# Patient Record
Sex: FEMALE | Race: WHITE | NOT HISPANIC OR LATINO | Employment: FULL TIME | URBAN - METROPOLITAN AREA
[De-identification: names, ages, dates, MRNs, and addresses within clinical notes are randomized per-mention and may not be internally consistent; named-entity substitution may affect disease eponyms.]

---

## 2017-03-31 ENCOUNTER — RADIANT APPOINTMENT (OUTPATIENT)
Dept: GENERAL RADIOLOGY | Facility: CLINIC | Age: 27
End: 2017-03-31
Attending: INTERNAL MEDICINE
Payer: COMMERCIAL

## 2017-03-31 ENCOUNTER — OFFICE VISIT (OUTPATIENT)
Dept: RHEUMATOLOGY | Facility: CLINIC | Age: 27
End: 2017-03-31
Payer: COMMERCIAL

## 2017-03-31 VITALS
HEART RATE: 92 BPM | DIASTOLIC BLOOD PRESSURE: 79 MMHG | WEIGHT: 171.4 LBS | SYSTOLIC BLOOD PRESSURE: 129 MMHG | TEMPERATURE: 98.2 F | BODY MASS INDEX: 29.65 KG/M2

## 2017-03-31 DIAGNOSIS — R82.81 PYURIA: ICD-10-CM

## 2017-03-31 DIAGNOSIS — E55.9 VITAMIN D INSUFFICIENCY: ICD-10-CM

## 2017-03-31 DIAGNOSIS — M08.00 JUVENILE RHEUMATOID ARTHRITIS (H): Primary | ICD-10-CM

## 2017-03-31 DIAGNOSIS — Z79.60 LONG-TERM USE OF IMMUNOSUPPRESSANT MEDICATION: ICD-10-CM

## 2017-03-31 DIAGNOSIS — M08.00 JUVENILE RHEUMATOID ARTHRITIS (H): ICD-10-CM

## 2017-03-31 DIAGNOSIS — K20.0 EOSINOPHILIC ESOPHAGITIS: ICD-10-CM

## 2017-03-31 PROCEDURE — 73110 X-RAY EXAM OF WRIST: CPT | Mod: RT | Performed by: RADIOLOGY

## 2017-03-31 PROCEDURE — 73130 X-RAY EXAM OF HAND: CPT | Mod: LT | Performed by: RADIOLOGY

## 2017-03-31 PROCEDURE — 99214 OFFICE O/P EST MOD 30 MIN: CPT | Performed by: INTERNAL MEDICINE

## 2017-03-31 RX ORDER — HYDROXYCHLOROQUINE SULFATE 200 MG/1
200 TABLET, FILM COATED ORAL 2 TIMES DAILY
Qty: 180 TABLET | Refills: 3 | Status: SHIPPED | OUTPATIENT
Start: 2017-03-31 | End: 2017-10-20

## 2017-03-31 RX ORDER — PREDNISONE 5 MG/1
5 TABLET ORAL DAILY PRN
Qty: 30 TABLET | Refills: 0 | Status: CANCELLED | OUTPATIENT
Start: 2017-03-31

## 2017-03-31 RX ORDER — ERGOCALCIFEROL 1.25 MG/1
50000 CAPSULE, LIQUID FILLED ORAL
Qty: 3 CAPSULE | Refills: 3 | Status: SHIPPED | OUTPATIENT
Start: 2017-03-31 | End: 2017-10-20

## 2017-03-31 ASSESSMENT — ROUTINE ASSESSMENT OF PATIENT INDEX DATA (RAPID3)
TOTAL RAPID3 SCORE: 2.5
RAPID3 INTERPRETATION: REMISSION

## 2017-03-31 ASSESSMENT — PAIN SCALES - GENERAL: PAINLEVEL: NO PAIN (0)

## 2017-03-31 NOTE — NURSING NOTE
"Alka Casey's goals for this visit include:   Chief Complaint   Patient presents with     RECHECK     6 month follow up        She requests these members of her care team be copied on today's visit information:yes     PCP: Rojelio Ivey    Referring Provider:  No referring provider defined for this encounter.    Chief Complaint   Patient presents with     RECHECK     6 month follow up        Initial /79  Pulse 92  Temp 98.2  F (36.8  C)  Wt 77.7 kg (171 lb 6.4 oz)  LMP 03/17/2017  BMI 29.65 kg/m2 Estimated body mass index is 29.65 kg/(m^2) as calculated from the following:    Height as of 12/1/16: 1.619 m (5' 3.75\").    Weight as of this encounter: 77.7 kg (171 lb 6.4 oz).  Medication Reconciliation: complete    Do you need any medication refills at today's visit? yes    "

## 2017-03-31 NOTE — PROGRESS NOTES
Ms. 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 -DELL, -CCP, -RF, -Ro. No erosions.    She complains of increasing lower back pain. The pain can occur in waves and sometimes radiate a bit to the left leg.  Stretching and strengthening exercises have been helpful. She has a history chronic low back pain. This can occasionally interfere with sleeping.    No other joint swelling, redness or warmth. No reported flare ups, or dysfunction and no new deformity. She does note a lot of cracking and popping around the joints. No AM stiffness. Poor energy. Depression control is fine.    Hydroxychloroquine 400 mg daily with good tolerance. Eye check normal in July.  No GERD complaints.    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 , no children. No tobacco, social EtOH, no illicit drugs. No exposures to infectious hepatitis or HIV. Right handed.     PMSF history personally obtained and updated by me today.    ROS:  +headaches  +cognitive/hearing issues  +irritable  +palpation  +finger tingling intermittently  +allergies to pets and seasonal allergies  Remainder of the 14 point ROS obtained and found negative.    Physical Exam:  Constitutional: WD-WN-WG cooperative  Eyes: nl EOM, PERRLA, vision, conjunctiva, sclera  ENT: nl external ears, nose, hearing, lips, teeth, gums, throat; +modest parotid swelling  Neck: no mass or thyroid enlargement  Resp: lungs clear to auscultation, nl to palpation, nl effort  CV: RRR, no murmurs, rubs or gallops, no edema  GI: no ABD mass or tenderness, no HSM  :  not tested  Lymph: no cervical or epitrochlear nodes  MS:  +Trace left wrist swelling; All other TMJ, neck, shoulder, elbow, wrist, hand, spine, hip, knee, ankle, and foot joints were examined and otherwise found normal. Normal  strength. Full ROM. Normal tuck and prayer.  Skin: no nail pitting, alopecia, rash  Neuro: nl cranial nerves, strength, sensation, DTRs.   Psych: nl judgement, orientation, memory, affect.    Laboratory:    My impression of the bilateral hand Xrays is subtle contour changes at both styloids and early DJD at both thumbs.     Impression:  Normal films of the hands.  I have personally reviewed the examination and initial interpretation  and I agree with the findings.     MAYCOL PINEDO MD  Impression:    Pauciarticular Chronic Juvenile Rheumatoid Arthritis-with excellent control of synovitis and no evidence of joint disease progression. Good tolerance of the hydroxycloroquine and we will continue with this regimen.    Eosinophilic esophagitis-stable and free of any major symptoms. Avoid ibuprofen.    Long term monitoring of immunosuppression-with eye check stable. Get laboratory testing today.    Plan RTC in 6 months.

## 2017-03-31 NOTE — LETTER
3/31/2017       RE: Alka Casey  [address]  [place] [address]     Dear Colleague,    Thank you for referring your patient, Alka Casey, to the [facility]. Please see a copy of my visit note below.    Ms. 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 -DELL, -CCP, -RF, -Ro. No erosions.    She complains of increasing lower back pain. The pain can occur in waves and sometimes radiate a bit to the left leg.  Stretching and strengthening exercises have been helpful. She has a history chronic low back pain. This can occasionally interfere with sleeping.    No other joint swelling, redness or warmth. No reported flare ups, or dysfunction and no new deformity. She does note a lot of cracking and popping around the joints. No AM stiffness. Poor energy. Depression control is fine.    Hydroxychloroquine 400 mg daily with good tolerance. Eye check normal in July.  No GERD complaints.    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 , no children. No tobacco, social EtOH, no illicit drugs. No exposures to infectious hepatitis or HIV. Right handed.     PMSF history personally obtained and updated by me today.    ROS:  +headaches  +cognitive/hearing issues  +irritable  +palpation  +finger tingling intermittently  +allergies to pets and seasonal allergies  Remainder of the 14 point ROS obtained and found negative.    Physical Exam:  Constitutional: WD-WN-WG cooperative  Eyes: nl EOM, PERRLA, vision, conjunctiva,  sclera  ENT: nl external ears, nose, hearing, lips, teeth, gums, throat; +modest parotid swelling  Neck: no mass or thyroid enlargement  Resp: lungs clear to auscultation, nl to palpation, nl effort  CV: RRR, no murmurs, rubs or gallops, no edema  GI: no ABD mass or tenderness, no HSM  : not tested  Lymph: no cervical or epitrochlear nodes  MS:  +Trace left wrist swelling; All other TMJ, neck, shoulder, elbow, wrist, hand, spine, hip, knee, ankle, and foot joints were examined and otherwise found normal. Normal  strength. Full ROM. Normal tuck and prayer.  Skin: no nail pitting, alopecia, rash  Neuro: nl cranial nerves, strength, sensation, DTRs.   Psych: nl judgement, orientation, memory, affect.    Laboratory:    My impression of the bilateral hand Xrays is subtle contour changes at both styloids and early DJD at both thumbs.     Impression:  Normal films of the hands.  I have personally reviewed the examination and initial interpretation  and I agree with the findings.     MAYCOL PINEDO MD  Impression:    Pauciarticular Chronic Juvenile Rheumatoid Arthritis-with excellent control of synovitis and no evidence of joint disease progression. Good tolerance of the hydroxycloroquine and we will continue with this regimen.    Eosinophilic esophagitis-stable and free of any major symptoms. Avoid ibuprofen.    Long term monitoring of immunosuppression-with eye check stable. Get laboratory testing today.    Plan RTC in 6 months.          Again, thank you for allowing me to participate in the care of your patient.      Sincerely,    Chris Duff MD

## 2017-08-21 ENCOUNTER — MYC MEDICAL ADVICE (OUTPATIENT)
Dept: FAMILY MEDICINE | Facility: CLINIC | Age: 27
End: 2017-08-21

## 2017-08-21 NOTE — TELEPHONE ENCOUNTER
Disregard this mychart message - per patient, this was meant for rheumatology and was resent to correct recipient.  .

## 2017-09-14 ENCOUNTER — TRANSFERRED RECORDS (OUTPATIENT)
Dept: HEALTH INFORMATION MANAGEMENT | Facility: CLINIC | Age: 27
End: 2017-09-14

## 2017-09-22 DIAGNOSIS — Z30.41 SURVEILLANCE OF PREVIOUSLY PRESCRIBED CONTRACEPTIVE PILL: ICD-10-CM

## 2017-09-22 NOTE — TELEPHONE ENCOUNTER
norgestrel-ethinyl estradiol (OVRAL) 0.5-50 MG-MCG per tablet      Last Written Prescription Date: 12/01/16  Last Fill Quantity: 84,  # refills: 3   Last Office Visit with FMZAY, DENY or [facility] prescribing provider: 12/01/16                                         Next 5 appointments (look out 90 days)     Oct 20, 2017  8:00 AM CDT   [facility] Visit with Chris Duff MD   [facility]    [address] [place] [id number]   [phone]                      Sunita Quiroz  [employer] [facility]

## 2017-10-20 ENCOUNTER — OFFICE VISIT (OUTPATIENT)
Dept: RHEUMATOLOGY | Facility: CLINIC | Age: 27
End: 2017-10-20
Payer: COMMERCIAL

## 2017-10-20 VITALS
DIASTOLIC BLOOD PRESSURE: 74 MMHG | OXYGEN SATURATION: 97 % | BODY MASS INDEX: 30.29 KG/M2 | HEART RATE: 87 BPM | WEIGHT: 175.1 LBS | SYSTOLIC BLOOD PRESSURE: 110 MMHG | TEMPERATURE: 98.4 F

## 2017-10-20 DIAGNOSIS — E55.9 VITAMIN D INSUFFICIENCY: ICD-10-CM

## 2017-10-20 DIAGNOSIS — K20.0 EOSINOPHILIC ESOPHAGITIS: ICD-10-CM

## 2017-10-20 DIAGNOSIS — Z79.60 LONG-TERM USE OF IMMUNOSUPPRESSANT MEDICATION: ICD-10-CM

## 2017-10-20 DIAGNOSIS — M08.00 JUVENILE RHEUMATOID ARTHRITIS (H): ICD-10-CM

## 2017-10-20 DIAGNOSIS — R82.81 PYURIA: ICD-10-CM

## 2017-10-20 LAB
ALBUMIN SERPL-MCNC: 3.4 G/DL (ref 3.4–5)
ALT SERPL W P-5'-P-CCNC: 35 U/L (ref 0–50)
AST SERPL W P-5'-P-CCNC: 30 U/L (ref 0–45)
BASOPHILS # BLD AUTO: 0 10E9/L (ref 0–0.2)
BASOPHILS NFR BLD AUTO: 0.6 %
CREAT SERPL-MCNC: 0.7 MG/DL (ref 0.52–1.04)
DIFFERENTIAL METHOD BLD: NORMAL
EOSINOPHIL # BLD AUTO: 0.4 10E9/L (ref 0–0.7)
EOSINOPHIL NFR BLD AUTO: 8.7 %
ERYTHROCYTE [DISTWIDTH] IN BLOOD BY AUTOMATED COUNT: 13 % (ref 10–15)
GFR SERPL CREATININE-BSD FRML MDRD: >90 ML/MIN/1.7M2
HCT VFR BLD AUTO: 41.3 % (ref 35–47)
HGB BLD-MCNC: 14.4 G/DL (ref 11.7–15.7)
LYMPHOCYTES # BLD AUTO: 1.6 10E9/L (ref 0.8–5.3)
LYMPHOCYTES NFR BLD AUTO: 33.9 %
MCH RBC QN AUTO: 30 PG (ref 26.5–33)
MCHC RBC AUTO-ENTMCNC: 34.9 G/DL (ref 31.5–36.5)
MCV RBC AUTO: 86 FL (ref 78–100)
MONOCYTES # BLD AUTO: 0.4 10E9/L (ref 0–1.3)
MONOCYTES NFR BLD AUTO: 7.4 %
NEUTROPHILS # BLD AUTO: 2.4 10E9/L (ref 1.6–8.3)
NEUTROPHILS NFR BLD AUTO: 49.4 %
PLATELET # BLD AUTO: 236 10E9/L (ref 150–450)
RBC # BLD AUTO: 4.8 10E12/L (ref 3.8–5.2)
WBC # BLD AUTO: 4.8 10E9/L (ref 4–11)

## 2017-10-20 PROCEDURE — 82565 ASSAY OF CREATININE: CPT | Performed by: INTERNAL MEDICINE

## 2017-10-20 PROCEDURE — 82040 ASSAY OF SERUM ALBUMIN: CPT | Performed by: INTERNAL MEDICINE

## 2017-10-20 PROCEDURE — 85025 COMPLETE CBC W/AUTO DIFF WBC: CPT | Performed by: INTERNAL MEDICINE

## 2017-10-20 PROCEDURE — 84460 ALANINE AMINO (ALT) (SGPT): CPT | Performed by: INTERNAL MEDICINE

## 2017-10-20 PROCEDURE — 84450 TRANSFERASE (AST) (SGOT): CPT | Performed by: INTERNAL MEDICINE

## 2017-10-20 PROCEDURE — 36415 COLL VENOUS BLD VENIPUNCTURE: CPT | Performed by: INTERNAL MEDICINE

## 2017-10-20 PROCEDURE — 99214 OFFICE O/P EST MOD 30 MIN: CPT | Performed by: INTERNAL MEDICINE

## 2017-10-20 PROCEDURE — 82306 VITAMIN D 25 HYDROXY: CPT | Performed by: INTERNAL MEDICINE

## 2017-10-20 RX ORDER — ERGOCALCIFEROL 1.25 MG/1
50000 CAPSULE, LIQUID FILLED ORAL
Qty: 3 CAPSULE | Refills: 3 | Status: SHIPPED | OUTPATIENT
Start: 2017-10-20 | End: 2018-05-24

## 2017-10-20 RX ORDER — HYDROXYCHLOROQUINE SULFATE 200 MG/1
200 TABLET, FILM COATED ORAL 2 TIMES DAILY
Qty: 180 TABLET | Refills: 3 | Status: SHIPPED | OUTPATIENT
Start: 2017-10-20 | End: 2018-06-08

## 2017-10-20 RX ORDER — PREDNISONE 5 MG/1
5 TABLET ORAL DAILY PRN
Qty: 30 TABLET | Refills: 0 | Status: CANCELLED | OUTPATIENT
Start: 2017-10-20

## 2017-10-20 ASSESSMENT — ROUTINE ASSESSMENT OF PATIENT INDEX DATA (RAPID3)
TOTAL RAPID3 SCORE: 4.8
RAPID3 INTERPRETATION: LOW 3.1-6

## 2017-10-20 NOTE — PROGRESS NOTES
Ms. 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 -DELL, -CCP, -RF, -Ro. No erosions.    She reports a left ankle inversion strain that resulted in swelling and pain about a month ago, this is only slowly improving, but she can walk on it and no instability. Her other joints are stable. Weather changes cause more joint aching. No other redness, warmth or swelling. She does report a synovial cyst at the base of the left 2nd MCP. She has sore hips with abduction. Energy is okay. No AM stiffness. Sleep is good. A bit more anxiety.    Hydroxychloroquine 400 mg daily with good tolerance. Eye check normal in September.  More GERD problems the past 3 days. Vitamin D for insufficiency.    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 , no children. No tobacco, social EtOH, no illicit drugs. No exposures to infectious hepatitis or HIV. Right handed.     PMSF history personally obtained and updated by me today in the clinic.    ROS:  +she has a bit more stress and anxiety  +palpitation with anxiety  +intermittent numbness in fingers  +minor GERD  +intermittent diarrhea  +allergies to pets and seasonal allergies  Remainder of the 14 point ROS obtained and found negative.    Physical Exam:  Constitutional: WD-WN-WG cooperative  Eyes: nl EOM, PERRLA, vision, conjunctiva, sclera  ENT: nl external ears, nose, hearing, lips, teeth, gums, throat; +modest parotid swelling  Neck: no mass or thyroid enlargement  Resp: lungs clear to auscultation, nl to palpation, nl effort  CV: RRR,  no murmurs, rubs or gallops, no edema  GI: no ABD mass or tenderness, no HSM  : not tested  Lymph: no cervical or epitrochlear nodes  MS:  +Trace bilateral wrist swelling L>R; trace left ankle swelling. All other TMJ, neck, shoulder, elbow, wrist, hand, spine, hip, knee, ankle, and foot joints were examined and otherwise found normal. Normal  strength. Full ROM. Normal tuck and prayer.  Skin: no nail pitting, alopecia, rash  Neuro: nl cranial nerves, strength, sensation, DTRs.   Psych: nl judgement, orientation, memory, affect.    Laboratory:    Component      Latest Ref Rng & Units 10/20/2017   WBC      4.0 - 11.0 10e9/L 4.8   RBC Count      3.8 - 5.2 10e12/L 4.80   Hemoglobin      11.7 - 15.7 g/dL 14.4   Hematocrit      35.0 - 47.0 % 41.3   MCV      78 - 100 fl 86   MCH      26.5 - 33.0 pg 30.0   MCHC      31.5 - 36.5 g/dL 34.9   RDW      10.0 - 15.0 % 13.0   Platelet Count      150 - 450 10e9/L 236   Diff Method       Automated Method   % Neutrophils      % 49.4   % Lymphocytes      % 33.9   % Monocytes      % 7.4   % Eosinophils      % 8.7   % Basophils      % 0.6   Absolute Neutrophil      1.6 - 8.3 10e9/L 2.4   Absolute Lymphocytes      0.8 - 5.3 10e9/L 1.6   Absolute Monocytes      0.0 - 1.3 10e9/L 0.4   Absolute Eosinophils      0.0 - 0.7 10e9/L 0.4   Absolute Basophils      0.0 - 0.2 10e9/L 0.0   Creatinine      0.52 - 1.04 mg/dL 0.70   GFR Estimate      >60 mL/min/1.7m2 >90   GFR Estimate If Black      >60 mL/min/1.7m2 >90   AST      0 - 45 U/L 30   ALT      0 - 50 U/L 35   Albumin      3.4 - 5.0 g/dL 3.4        Impression:    Pauciarticular Chronic Juvenile Rheumatoid Arthritis-with stable wrist synovitis. Nevertheless, good control of symptoms and no evidence of progression. Good tolerance of the hydroxychloroquine and normal eye check. We will continue the regimen. Considering pregnancy and we will have a conversation about the hydroxychloroquine once an attempt is planned.    Left ankle  injury-improving and stable left ankle function. We will simply monitor.    Eosinophilic esophagitis-stable and free of any major symptoms.    Long term monitoring of immunosuppression-with eye check stable.     Plan RTC in 6 months.

## 2017-10-20 NOTE — NURSING NOTE
"Alka Casey's goals for this visit include: Left ankle swollen and fatigue    Juvenile rheumatoid arthritis (H)   Long-term use of immunosuppressant medication   Vitamin D insufficiency   Eosinophilic esophagitis     She requests these members of her care team be copied on today's visit information: yes    PCP: Rojelio Ivey    Referring Provider:  No referring provider defined for this encounter.    Chief Complaint   Patient presents with     Arthritis       Initial /74 (BP Location: Left arm, Patient Position: Chair, Cuff Size: Adult Regular)  Pulse 87  Temp 98.4  F (36.9  C) (Oral)  Wt 79.4 kg (175 lb 1.6 oz)  SpO2 97%  BMI 30.29 kg/m2 Estimated body mass index is 30.29 kg/(m^2) as calculated from the following:    Height as of 12/1/16: 1.619 m (5' 3.75\").    Weight as of this encounter: 79.4 kg (175 lb 1.6 oz).  Medication Reconciliation: complete    Do you need any medication refills at today's visit? Yes     "

## 2017-10-20 NOTE — MR AVS SNAPSHOT
After Visit Summary   10/20/2017    Alka Casey    MRN: [id number]           Patient Information     Date Of Birth          1990        Visit Information        Provider Department      10/20/2017 8:00 AM Chris Duff MD [facility]        Today's Diagnoses     Juvenile rheumatoid arthritis (H)        Long-term use of immunosuppressant medication        Vitamin D insufficiency        Eosinophilic esophagitis        Pyuria        Juvenile rheumatoid arthritis           Follow-ups after your visit        Follow-up notes from your care team     Return in about 6 months (around 4/20/2018).      Your next 10 appointments already scheduled     Mar 23, 2018  8:00 AM CDT   Return Visit with Chris Duff MD   [facility] ([facility]    [address] [place] [id number] [phone]              Future tests that were ordered for you today     Open Standing Orders        Priority Remaining Interval Expires Ordered    CBC with platelets Routine 2/2 every 6 months 10/20/2018 10/20/2017    Creatinine Routine 2/2 every 6 months 10/20/2018 10/20/2017    AST Routine 2/2 every 6 months 10/20/2018 10/20/2017    ALT Routine 2/2 every 6 months 10/20/2018 10/20/2017    Albumin level Routine 2/2 every 6 months 10/20/2018 10/20/2017            Who to contact     If you have questions or need follow up information about today's clinic visit or your schedule please contact [facility] directly at [phone].  Normal or non-critical lab and imaging results will be communicated to you by MyChart, letter or phone within 4 business days after the clinic has received the results. If you do not hear from us within 7 days, please contact the clinic through MyChart or phone. If you have a critical or abnormal lab result, we will notify you by phone as soon as possible.  Submit refill requests through [employer] or call your pharmacy  and they will forward the refill request to us. Please allow 3 business days for your refill to be completed.          Additional Information About Your Visit        [employer] Information     [employer] gives you secure access to your electronic health record. If you see a primary care provider, you can also send messages to your care team and make appointments. If you have questions, please call your primary care clinic.  If you do not have a primary care provider, please call [phone] and they will assist you.      [employer] is an electronic gateway that provides easy, online access to your medical records. With [employer], you can request a clinic appointment, read your test results, renew a prescription or communicate with your care team.     To access your existing account, please contact your [facility] or call [phone] for assistance.        Care EveryWhere ID     This is your Care EveryWhere ID. This could be used by other organizations to access your [place] medical records  [id number]        Your Vitals Were     Pulse Temperature Pulse Oximetry BMI (Body Mass Index)          87 98.4  F (36.9  C) (Oral) 97% 30.29 kg/m2         Blood Pressure from Last 3 Encounters:   10/20/17 110/74   03/31/17 129/79   12/01/16 110/68    Weight from Last 3 Encounters:   10/20/17 79.4 kg (175 lb 1.6 oz)   03/31/17 77.7 kg (171 lb 6.4 oz)   12/01/16 76.3 kg (168 lb 3.2 oz)                 Where to get your medicines      These medications were sent to [id number] - [place], [place] - [address]  [address] [address]     Phone:  [phone]     hydroxychloroquine 200 MG tablet    vitamin D 35168 UNIT capsule          Primary Care Provider Office Phone # Fax #    Rojelio Ivey [phone]       [address]  [facility] [address]        Equal Access to Services     SAMMIE LANTIGUA AH: lou Kelly,  arvind haley toribiovale valverde ah. [facility] [phone].    ATENCIÓN: Si renetta ko, tiene a peñaloza disposición servicios gratuitos de asistencia lingüística. Jag al [phone].    We comply with applicable federal civil rights laws and [place] laws. We do not discriminate on the basis of race, color, national origin, age, disability, sex, sexual orientation, or gender identity.            Thank you!     Thank you for choosing [facility]  for your care. Our goal is always to provide you with excellent care. Hearing back from our patients is one way we can continue to improve our services. Please take a few minutes to complete the written survey that you may receive in the mail after your visit with us. Thank you!             Your Updated Medication List - Protect others around you: Learn how to safely use, store and throw away your medicines at www.disposemymeds.org.          This list is accurate as of: 10/20/17  8:44 AM.  Always use your most recent med list.                   Brand Name Dispense Instructions for use Diagnosis    adapalene 0.1 % gel    DIFFERIN    45 g    Apply topically At Bedtime    Acne vulgaris       hydroxychloroquine 200 MG tablet    PLAQUENIL    180 tablet    Take 1 tablet (200 mg) by mouth 2 times daily    Juvenile rheumatoid arthritis (H), Long-term use of immunosuppressant medication, Vitamin D insufficiency, Eosinophilic esophagitis, Pyuria, Juvenile rheumatoid arthritis (H)       levalbuterol 45 MCG/ACT Inhaler    XOPENEX HFA    1 Inhaler    Inhale 2 puffs into the lungs every 4 hours as needed for asthma symptoms.    Mild persistent asthma without complication       levocetirizine 5 MG tablet    XYZAL    90 tablet    Take 1 tablet (5 mg) by mouth daily for allergy prevention.    Perennial allergic rhinitis with seasonal variation       montelukast 10 MG tablet    SINGULAIR    90 tablet    Take 1 tablet (10 mg) by mouth daily  for asthma prevention.    Mild persistent asthma without complication       norgestrel-ethinyl estradiol 0.5-50 MG-MCG per tablet    OVRAL    84 tablet    Take 1 tablet by mouth daily    Surveillance of previously prescribed contraceptive pill       predniSONE 5 MG tablet    DELTASONE    30 tablet    Take 1 tablet (5 mg) by mouth daily as needed For joint pain and swelling flare. Use for 1 week.    Juvenile rheumatoid arthritis (H), Long-term use of immunosuppressant medication, Vitamin D insufficiency, Pyuria, Eosinophilic esophagitis, Juvenile rheumatoid arthritis (H)       sertraline 100 MG tablet    ZOLOFT    135 tablet    Take 1.5 tablets (150 mg) by mouth daily for depression.    Major depressive disorder, recurrent episode, mild (H)       vitamin D 58597 UNIT capsule    ERGOCALCIFEROL    3 capsule    Take 1 capsule (50,000 Units) by mouth every 30 days    Juvenile rheumatoid arthritis (H), Long-term use of immunosuppressant medication, Vitamin D insufficiency, Eosinophilic esophagitis, Pyuria, Juvenile rheumatoid arthritis (H)

## 2017-10-24 LAB
DEPRECATED CALCIDIOL+CALCIFEROL SERPL-MC: <25 UG/L (ref 20–75)
VITAMIN D2 SERPL-MCNC: <5 UG/L
VITAMIN D3 SERPL-MCNC: 20 UG/L

## 2017-11-02 ENCOUNTER — OFFICE VISIT (OUTPATIENT)
Dept: FAMILY MEDICINE | Facility: CLINIC | Age: 27
End: 2017-11-02
Payer: COMMERCIAL

## 2017-11-02 VITALS
HEIGHT: 64 IN | DIASTOLIC BLOOD PRESSURE: 76 MMHG | BODY MASS INDEX: 30.22 KG/M2 | OXYGEN SATURATION: 99 % | WEIGHT: 177 LBS | SYSTOLIC BLOOD PRESSURE: 124 MMHG | HEART RATE: 77 BPM | TEMPERATURE: 98.5 F

## 2017-11-02 DIAGNOSIS — J45.30 MILD PERSISTENT ASTHMA WITHOUT COMPLICATION: ICD-10-CM

## 2017-11-02 DIAGNOSIS — Z31.69 PRE-CONCEPTION COUNSELING: ICD-10-CM

## 2017-11-02 DIAGNOSIS — Z30.41 SURVEILLANCE OF PREVIOUSLY PRESCRIBED CONTRACEPTIVE PILL: ICD-10-CM

## 2017-11-02 DIAGNOSIS — J30.2 PERENNIAL ALLERGIC RHINITIS WITH SEASONAL VARIATION: ICD-10-CM

## 2017-11-02 DIAGNOSIS — F33.0 MAJOR DEPRESSIVE DISORDER, RECURRENT EPISODE, MILD (H): ICD-10-CM

## 2017-11-02 DIAGNOSIS — J30.89 PERENNIAL ALLERGIC RHINITIS WITH SEASONAL VARIATION: ICD-10-CM

## 2017-11-02 DIAGNOSIS — Z00.00 ENCOUNTER FOR ROUTINE ADULT HEALTH EXAMINATION WITHOUT ABNORMAL FINDINGS: Primary | ICD-10-CM

## 2017-11-02 DIAGNOSIS — Z79.899 LONG-TERM USE OF HIGH-RISK MEDICATION: ICD-10-CM

## 2017-11-02 PROCEDURE — 99395 PREV VISIT EST AGE 18-39: CPT | Performed by: FAMILY MEDICINE

## 2017-11-02 RX ORDER — MONTELUKAST SODIUM 10 MG/1
1 TABLET ORAL DAILY
Qty: 90 TABLET | Refills: 3 | Status: SHIPPED | OUTPATIENT
Start: 2017-11-02

## 2017-11-02 RX ORDER — SERTRALINE HYDROCHLORIDE 100 MG/1
100 TABLET, FILM COATED ORAL DAILY
Qty: 90 TABLET | Refills: 1 | Status: SHIPPED | OUTPATIENT
Start: 2017-11-02 | End: 2018-05-24

## 2017-11-02 RX ORDER — LEVALBUTEROL TARTRATE 45 UG/1
2 AEROSOL, METERED ORAL EVERY 4 HOURS PRN
Qty: 1 INHALER | Refills: 3 | Status: SHIPPED | OUTPATIENT
Start: 2017-11-02

## 2017-11-02 RX ORDER — LEVOCETIRIZINE DIHYDROCHLORIDE 5 MG/1
5 TABLET, FILM COATED ORAL DAILY
Qty: 90 TABLET | Refills: 3 | Status: SHIPPED | OUTPATIENT
Start: 2017-11-02 | End: 2018-12-21

## 2017-11-02 ASSESSMENT — ENCOUNTER SYMPTOMS
JOINT SWELLING: 0
WEAKNESS: 0
CHILLS: 0
HEARTBURN: 0
DIARRHEA: 0
FEVER: 0
ARTHRALGIAS: 0
ABDOMINAL PAIN: 0
PARESTHESIAS: 0
HEMATURIA: 0
SORE THROAT: 0
NAUSEA: 0
SHORTNESS OF BREATH: 0
HEMATOCHEZIA: 0
NERVOUS/ANXIOUS: 0
COUGH: 0
MYALGIAS: 0
DYSURIA: 0
FREQUENCY: 0
HEADACHES: 0
CONSTIPATION: 0
EYE PAIN: 0
DIZZINESS: 0
PALPITATIONS: 0

## 2017-11-02 NOTE — PROGRESS NOTES
SUBJECTIVE:   CC: Alka Casey is an 27 year old woman who presents for preventive health visit.     HPI Comments: Would also like a referral to a gynecologist because she will soon be trying to get pregnant.    Physical   Annual:     Getting at least 3 servings of Calcium per day::  NO    Bi-annual eye exam::  Yes    Dental care twice a year::  Yes    Sleep apnea or symptoms of sleep apnea::  Daytime drowsiness    Diet::  Regular (no restrictions)    Frequency of exercise::  2-3 days/week    Duration of exercise::  15-30 minutes    Taking medications regularly::  Yes    Medication side effects::  None    Additional concerns today::  YES    -------------------------------------    Today's PHQ-2 Score:   PHQ-2 ( 1999 [employer]) 10/30/2017   Q1: Little interest or pleasure in doing things 1   Q2: Feeling down, depressed or hopeless 1   PHQ-2 Score 2   Q1: Little interest or pleasure in doing things Several days   Q2: Feeling down, depressed or hopeless Several days   PHQ-2 Score 2       Abuse: Current or Past(Physical, Sexual or Emotional)- No  Do you feel safe in your environment - Yes    Social History   Substance Use Topics     Smoking status: Never Smoker     Smokeless tobacco: Never Used     Alcohol use 0.0 oz/week     0 Standard drinks or equivalent per week      Comment: occ-two or three     The patient does not drink >3 drinks per day nor >7 drinks per week.    Mammogram not appropriate for this patient based on age.    Pertinent mammograms are reviewed under the imaging tab.  History of abnormal Pap smear: NO - age 21-29 PAP every 3 years recommended      Review of Systems   Constitutional: Negative for chills and fever.   HENT: Negative for congestion, ear pain, hearing loss and sore throat.    Eyes: Negative for pain and visual disturbance.   Respiratory: Negative for cough and shortness of breath.         ACT = 25.   Cardiovascular: Negative for chest pain, palpitations and peripheral edema.  "  Gastrointestinal: Negative for abdominal pain, constipation, diarrhea, heartburn, hematochezia and nausea.   Genitourinary: Negative for dysuria, frequency, genital sores, hematuria, pelvic pain, urgency, vaginal bleeding and vaginal discharge.        She is concerned about stopping her birth control medication because she has a hx of ovarian cysts, and this has not been an issue since she started OCP.   Musculoskeletal: Negative for arthralgias, joint swelling and myalgias.   Skin: Negative for rash.   Neurological: Negative for dizziness, weakness, headaches and paresthesias.   Psychiatric/Behavioral: Negative for mood changes. The patient is not nervous/anxious.         PHQ-9 = 9. She's had the low energy symptoms for a long time.       This document serves as a record of the services and decisions personally performed and made by Dr. Ivey. It was created on his behalf by Nhung Mckeon, a trained medical scribe. The creation of this document is based the provider's statements to the medical scribe.  Nhung Mckeon November 2, 2017 7:11 PM   OBJECTIVE:   /76 (BP Location: Left arm, Patient Position: Chair, Cuff Size: Adult Regular)  Pulse 77  Temp 98.5  F (36.9  C) (Oral)  Ht 1.619 m (5' 3.75\")  Wt 80.3 kg (177 lb)  SpO2 99%  BMI 30.62 kg/m2     Physical Exam   Constitutional: She is oriented to person, place, and time. She appears well-developed and well-nourished. No distress.   HENT:   Right Ear: External ear normal.   Left Ear: External ear normal.   Nose: Nose normal.   Mouth/Throat: Oropharynx is clear and moist. No oropharyngeal exudate.   Eyes: Conjunctivae are normal. Pupils are equal, round, and reactive to light. Right eye exhibits no discharge. Left eye exhibits no discharge.   Neck: Neck supple. No tracheal deviation present. No thyromegaly present.   Cardiovascular: Normal rate, regular rhythm, S1 normal, S2 normal, normal heart sounds and normal pulses.  Exam reveals no S3, no " S4 and no friction rub.    No murmur heard.  Pulmonary/Chest: Effort normal and breath sounds normal. No respiratory distress. She has no wheezes. She has no rales.   Abdominal: Soft. Bowel sounds are normal. She exhibits no mass. There is no hepatosplenomegaly. There is no tenderness.   Genitourinary: No breast swelling, tenderness or discharge.   Musculoskeletal: Normal range of motion. She exhibits no edema.   Lymphadenopathy:     She has no cervical adenopathy.   Neurological: She is alert and oriented to person, place, and time. She has normal strength and normal reflexes. She exhibits normal muscle tone.   Skin: Skin is warm and dry. No rash noted.   Psychiatric: She has a normal mood and affect. Judgment and thought content normal. Cognition and memory are normal.       ASSESSMENT/PLAN:   (Z00.00) Encounter for routine adult health examination without abnormal findings  (primary encounter diagnosis)  Comment: Negative screening exam; up-to-date on preventive services.   Plan: Follow up in 1 year.    (Z31.69) Pre-conception counseling  Comment: She continues on her OCP for now, but she is planning on trying to become pregnant once she finishes grad school.   Plan: Prenatal Vit-Fe Fumarate-FA (PRENATAL         MULTIVITAMIN  PLUS IRON) 27-1 MG TABS, OB/GYN         REFERRAL        Recommended she start PNV 4-6 weeks before she stops OCP, but she can start sooner as any time if she would like.     (Z79.259) Long-term use of high-risk medication  Comment: She is on a DMARD and an SSRI, and she would like advice ahead of time so she can weigh her options (risk of continuing versus discontinuing each of her medications during pregnancy).   Plan: OB/GYN REFERRAL            (F33.0) Major depressive disorder, recurrent episode, mild (H)  Comment: Stable.  Plan: sertraline (ZOLOFT) 100 MG tablet, DEPRESSION         ACTION PLAN (DAP)        Monitor periodically.     (Z30.41) Surveillance of previously prescribed  "contraceptive pill  Comment: prescription update   Plan: norgestrel-ethinyl estradiol (OVRAL) 0.5-50         MG-MCG per tablet            (J45.30) Mild persistent asthma without complication  Comment: Well controlled.  Plan: levalbuterol (XOPENEX HFA) 45 MCG/ACT Inhaler,         montelukast (SINGULAIR) 10 MG tablet, Asthma         Action Plan (AAP)        Monitor periodically.     (J30.89,  J30.2) Perennial allergic rhinitis with seasonal variation  Comment: Refill request.   Plan: levocetirizine (XYZAL) 5 MG tablet               COUNSELING:  Reviewed preventive health counseling, as reflected in patient instructions  Special attention given to:        Regular exercise       Contraception       Family planning    BP Screening:   Last 3 BP Readings:    BP Readings from Last 3 Encounters:   11/02/17 124/76   10/20/17 110/74   03/31/17 129/79       The following was recommended to the patient:  Re-screen BP within a year and recommended lifestyle modifications     reports that she has never smoked. She has never used smokeless tobacco.    Estimated body mass index is 30.62 kg/(m^2) as calculated from the following:    Height as of this encounter: 1.619 m (5' 3.75\").    Weight as of this encounter: 80.3 kg (177 lb).   Weight management plan: reviewed and approved: Her exercise routine consists of using an elliptical 2-3 times per week and weights 2 times per week. She also does a lot of walking daily because she has a dog.      Counseling Resources:  ATP IV Guidelines  Pooled Cohorts Equation Calculator  Breast Cancer Risk Calculator  FRAX Risk Assessment  ICSI Preventive Guidelines  Dietary Guidelines for Americans, 2010  USDA's MyPlate  ASA Prophylaxis  Lung CA Screening    The information in this document, created by the medical scribe for me, accurately reflects the services I personally performed and the decisions made by me. I have reviewed and approved this document for accuracy prior to leaving the patient care " area. November 2, 2017 7:11 PM    Rojelio Ivey MD  [facility]    Answers for HPI/ROS submitted by the patient on 10/30/2017   PHQ-2 Score: 2

## 2017-11-02 NOTE — MR AVS SNAPSHOT
After Visit Summary   11/2/2017    Alka Casey    MRN: [id number]           Patient Information     Date Of Birth          1990        Visit Information        Provider Department      11/2/2017 6:40 PM Rojelio Ivey MD [facility]        Today's Diagnoses     Encounter for routine adult health examination without abnormal findings    -  1    Pre-conception counseling        Long-term use of high-risk medication        Major depressive disorder, recurrent episode, mild (H)        Surveillance of previously prescribed contraceptive pill        Mild persistent asthma without complication          Care Instructions      Preventive Health Recommendations  Female Ages 26 - 39  Yearly exam:   See your health care provider every year in order to    Review health changes.     Discuss preventive care.      Review your medicines if you your doctor has prescribed any.    Until age 30: Get a Pap test every three years (more often if you have had an abnormal result).    After age 30: Talk to your doctor about whether you should have a Pap test every 3 years or have a Pap test with HPV screening every 5 years.   You do not need a Pap test if your uterus was removed (hysterectomy) and you have not had cancer.  You should be tested each year for STDs (sexually transmitted diseases), if you're at risk.   Talk to your provider about how often to have your cholesterol checked.  If you are at risk for diabetes, you should have a diabetes test (fasting glucose).  Shots: Get a flu shot each year. Get a tetanus shot every 10 years.   Nutrition:     Eat at least 5 servings of fruits and vegetables each day.    Eat whole-grain bread, whole-wheat pasta and brown rice instead of white grains and rice.    Talk to your provider about Calcium and Vitamin D.     Lifestyle    Exercise at least 150 minutes a week (30 minutes a day, 5 days of the week). This will help you control your weight and prevent  disease.    Limit alcohol to one drink per day.    No smoking.     Wear sunscreen to prevent skin cancer.    See your dentist every six months for an exam and cleaning.  At [facility], we strive to deliver an exceptional experience to you, every time we see you.  If you receive a survey in the mail, please send us back your thoughts. We really do value your feedback.    Based on your medical history, these are the current health maintenance/preventive care services that you are due for (some may have been done at this visit.)  Health Maintenance Due   Topic Date Due     ASTHMA CONTROL TEST Q6 MOS  06/01/2017     PHQ-9 Q6 MONTHS  06/01/2017     INFLUENZA VACCINE (SYSTEM ASSIGNED)  09/01/2017     ASTHMA ACTION PLAN Q1 YR  12/01/2017     DEPRESSION ACTION PLAN Q1 YR  12/01/2017         Suggested websites for health information:  Www.[facility]Only [place] : Up to date and easily searchable information on multiple topics.  Www.[employer].gov : medication info, interactive tutorials, watch real surgeries online  Www.familydoctor.org : good info from the Academy of Family Physicians  Www.cdc.gov : public health info, travel advisories, epidemics (H1N1)  Www.aap.org : children's health info, normal development, vaccinations  Www.health.[facility].[place].us : [place] dept of health, public health issues in [place], N1N1    Your care team:                            Family Medicine Internal Medicine   MD Srini Angel MD Shantel Branch-Fleming, MD Katya Georgiev PA-C Nam Ho, MD Pediatrics   MALU Dickey, CASSIE Max APRN CNP   MD Radha Bueno MD Deborah Mielke, MD Kim Thein, APRN CNP      Clinic hours: Monday - Thursday 7 am-7 pm; Fridays 7 am-5 pm.   Urgent care: Monday - Friday 11 am-9 pm; Saturday and Sunday 9 am-5 pm.  Pharmacy : Monday -Thursday 8 am-8 pm; Friday 8 am-6 pm; Saturday and Sunday 9 am-5 pm.     Clinic: [phone]   Pharmacy: [phone]            Follow-ups after your visit        Additional Services     OB/GYN REFERRAL       Your provider has referred you to:    [facility] [phone]   [email or web address]/[place]/  [facility]: [place] [facility] [phone]   http://www.[email or web address].[facility]/[place]/  [facility] [place] [facility] [phone]   http://www.[place].[facility]/  [facility]: [place] [facility] [phone]   Http://www.[email or web address].[facility]/[place]/    FM[facility]: [facility] [phone]   http://www.[place].[facility]/   [facility]: [place] [facility] [phone]   [email or web address]/  [place] [phone]   [email or web address]/  [place] [phone]   [email or web address]/    FM[facility]: [facility] [phone]   http://www.[place].[facility]/  FM[facility]: [facility] [phone]   [email or web address]/[facility]/[place]/    Please be aware that coverage of these services is subject to the terms and limitations of your health insurance plan.  Call member services at your health plan with any benefit or coverage questions.      Please bring the following with you to your appointment:    (1) Any X-Rays, CTs or MRIs which have been performed.  Contact the facility where they were done to arrange for  prior to your scheduled appointment.   (2) List of current medications   (3) This referral request   (4) Any documents/labs given to you for this referral                  Your next 10 appointments already scheduled     Mar 23, 2018  8:00 AM CDT   Return Visit with Chris Duff MD   [facility]    [address] [address] [place] [address]  "[place] [address] [phone]              Who to contact     If you have questions or need follow up information about today's clinic visit or your schedule please contact [facility] directly at [phone].  Normal or non-critical lab and imaging results will be communicated to you by MyChart, letter or phone within 4 business days after the clinic has received the results. If you do not hear from us within 7 days, please contact the clinic through [employer] or phone. If you have a critical or abnormal lab result, we will notify you by phone as soon as possible.  Submit refill requests through [employer] or call your pharmacy and they will forward the refill request to us. Please allow 3 business days for your refill to be completed.          Additional Information About Your Visit        [employer] Information     [employer] gives you secure access to your electronic health record. If you see a primary care provider, you can also send messages to your care team and make appointments. If you have questions, please call your primary care clinic.  If you do not have a primary care provider, please call [phone] and they will assist you.        Care EveryWhere ID     This is your Care EveryWhere ID. This could be used by other organizations to access your [place] medical records  [id number]        Your Vitals Were     Pulse Temperature Height Pulse Oximetry BMI (Body Mass Index)       77 98.5  F (36.9  C) (Oral) 5' 3.75\" (1.619 m) 99% 30.62 kg/m2        Blood Pressure from Last 3 Encounters:   11/02/17 124/76   10/20/17 110/74   03/31/17 129/79    Weight from Last 3 Encounters:   11/02/17 177 lb (80.3 kg)   10/20/17 175 lb 1.6 oz (79.4 kg)   03/31/17 171 lb 6.4 oz (77.7 kg)              We Performed the Following     Asthma Action Plan (AAP)     DEPRESSION ACTION PLAN (DAP)     OB/GYN REFERRAL          Today's Medication Changes          These changes are accurate as of: 11/2/17  7:25 PM.  If " you have any questions, ask your nurse or doctor.               Start taking these medicines.        Dose/Directions    prenatal multivitamin  plus iron 27-1 MG Tabs   Used for:  Pre-conception counseling   Started by:  Rojelio Ivey MD        Dose:  1 tablet   Take 1 tablet by mouth daily while pregnant or trying to become pregnant.   Quantity:  90 tablet   Refills:  3         These medicines have changed or have updated prescriptions.        Dose/Directions    sertraline 100 MG tablet   Commonly known as:  ZOLOFT   This may have changed:  how much to take   Used for:  Major depressive disorder, recurrent episode, mild (H)   Changed by:  Rojelio Ivey MD        Dose:  100 mg   Take 1 tablet (100 mg) by mouth daily for depression.   Quantity:  90 tablet   Refills:  1            Where to get your medicines      These medications were sent to [id number] - [place], [place] - [address]  [address] [address]     Phone:  [phone]     levalbuterol 45 MCG/ACT Inhaler    montelukast 10 MG tablet    norgestrel-ethinyl estradiol 0.5-50 MG-MCG per tablet    prenatal multivitamin  plus iron 27-1 MG Tabs    sertraline 100 MG tablet                Primary Care Provider Office Phone # Fax #    Rojelio Ivey [phone]       [address]  [facility] [address]        Equal Access to Services     PARVIZ LANTIGUA AH: Hadii aad ku hadasho Soomaali, waaxda luqadaha, qaybta kaalmada adeegyada, waxay idiin haycorby richmond. So [facility] [phone].    ATENCIÓN: Si habla español, tiene a peñaloza disposición servicios gratuitos de asistencia lingüística. Llame al [phone].    We comply with applicable federal civil rights laws and [place] laws. We do not discriminate on the basis of race, color, national origin, age, disability, sex, sexual orientation, or gender identity.            Thank you!     Thank you for choosing [facility]   for your care. Our goal is always to provide you with excellent care. Hearing back from our patients is one way we can continue to improve our services. Please take a few minutes to complete the written survey that you may receive in the mail after your visit with us. Thank you!             Your Updated Medication List - Protect others around you: Learn how to safely use, store and throw away your medicines at www.disposemymeds.org.          This list is accurate as of: 11/2/17  7:25 PM.  Always use your most recent med list.                   Brand Name Dispense Instructions for use Diagnosis    adapalene 0.1 % gel    DIFFERIN    45 g    Apply topically At Bedtime    Acne vulgaris       hydroxychloroquine 200 MG tablet    PLAQUENIL    180 tablet    Take 1 tablet (200 mg) by mouth 2 times daily    Juvenile rheumatoid arthritis (H), Long-term use of immunosuppressant medication, Vitamin D insufficiency, Eosinophilic esophagitis, Pyuria, Juvenile rheumatoid arthritis (H)       levalbuterol 45 MCG/ACT Inhaler    XOPENEX HFA    1 Inhaler    Inhale 2 puffs into the lungs every 4 hours as needed for asthma symptoms.    Mild persistent asthma without complication       levocetirizine 5 MG tablet    XYZAL    90 tablet    Take 1 tablet (5 mg) by mouth daily for allergy prevention.    Perennial allergic rhinitis with seasonal variation       montelukast 10 MG tablet    SINGULAIR    90 tablet    Take 1 tablet (10 mg) by mouth daily for asthma prevention.    Mild persistent asthma without complication       norgestrel-ethinyl estradiol 0.5-50 MG-MCG per tablet    OVRAL    84 tablet    Take 1 tablet by mouth daily    Surveillance of previously prescribed contraceptive pill       predniSONE 5 MG tablet    DELTASONE    30 tablet    Take 1 tablet (5 mg) by mouth daily as needed For joint pain and swelling flare. Use for 1 week.    Juvenile rheumatoid arthritis (H), Long-term use of immunosuppressant medication, Vitamin D  insufficiency, Pyuria, Eosinophilic esophagitis, Juvenile rheumatoid arthritis (H)       prenatal multivitamin  plus iron 27-1 MG Tabs     90 tablet    Take 1 tablet by mouth daily while pregnant or trying to become pregnant.    Pre-conception counseling       sertraline 100 MG tablet    ZOLOFT    90 tablet    Take 1 tablet (100 mg) by mouth daily for depression.    Major depressive disorder, recurrent episode, mild (H)       vitamin D 42924 UNIT capsule    ERGOCALCIFEROL    3 capsule    Take 1 capsule (50,000 Units) by mouth every 30 days    Juvenile rheumatoid arthritis (H), Long-term use of immunosuppressant medication, Vitamin D insufficiency, Eosinophilic esophagitis, Pyuria, Juvenile rheumatoid arthritis (H)

## 2017-11-02 NOTE — LETTER
My Depression Action Plan  Name: Alka Casey   Date of Birth 1990  Date: 11/2/2017    My doctor: Rojelio Ivey   My clinic: [address] [id number] [phone]          GREEN    ZONE   Good Control    What it looks like:     Things are going generally well. You have normal up s and down s. You may even feel depressed from time to time, but bad moods usually last less than a day.   What you need to do:  1. Continue to care for yourself (see self care plan)  2. Check your depression survival kit and update it as needed  3. Follow your physician s recommendations including any medication.  4. Do not stop taking medication unless you consult with your physician first.           YELLOW         ZONE Getting Worse    What it looks like:     Depression is starting to interfere with your life.     It may be hard to get out of bed; you may be starting to isolate yourself from others.    Symptoms of depression are starting to last most all day and this has happened for several days.     You may have suicidal thoughts but they are not constant.   What you need to do:     1. Call your care team, your response to treatment will improve if you keep your care team informed of your progress. Yellow periods are signs an adjustment may need to be made.     2. Continue your self-care, even if you have to fake it!    3. Talk to someone in your support network    4. Open up your depression survival kit           RED    ZONE Medical Alert - Get Help    What it looks like:     Depression is seriously interfering with your life.     You may experience these or other symptoms: You can t get out of bed most days, can t work or engage in other necessary activities, you have trouble taking care of basic hygiene, or basic responsibilities, thoughts of suicide or death that will not go away, self-injurious behavior.     What you need to do:  1. Call your care  team and request a same-day appointment. If they are not available (weekends or after hours) call your local crisis line, emergency room or 911.      Electronically signed by: Linda Hooker, November 2, 2017    Depression Self Care Plan / Survival Kit    Self-Care for Depression  Here s the deal. Your body and mind are really not as separate as most people think.  What you do and think affects how you feel and how you feel influences what you do and think. This means if you do things that people who feel good do, it will help you feel better.  Sometimes this is all it takes.  There is also a place for medication and therapy depending on how severe your depression is, so be sure to consult with your medical provider and/ or Behavioral Health Consultant if your symptoms are worsening or not improving.     In order to better manage my stress, I will:    Exercise  Get some form of exercise, every day. This will help reduce pain and release endorphins, the  feel good  chemicals in your brain. This is almost as good as taking antidepressants!  This is not the same as joining a gym and then never going! (they count on that by the way ) It can be as simple as just going for a walk or doing some gardening, anything that will get you moving.      Hygiene   Maintain good hygiene (Get out of bed in the morning, Make your bed, Brush your teeth, Take a shower, and Get dressed like you were going to work, even if you are unemployed).  If your clothes don't fit try to get ones that do.    Diet  I will strive to eat foods that are good for me, drink plenty of water, and avoid excessive sugar, caffeine, alcohol, and other mood-altering substances.  Some foods that are helpful in depression are: complex carbohydrates, B vitamins, flaxseed, fish or fish oil, fresh fruits and vegetables.    Psychotherapy  I agree to participate in Individual Therapy (if recommended).    Medication  If prescribed medications, I agree to take them.   Missing doses can result in serious side effects.  I understand that drinking alcohol, or other illicit drug use, may cause potential side effects.  I will not stop my medication abruptly without first discussing it with my provider.    Staying Connected With Others  I will stay in touch with my friends, family members, and my primary care provider/team.    Use your imagination  Be creative.  We all have a creative side; it doesn t matter if it s oil painting, sand castles, or mud pies! This will also kick up the endorphins.    Witness Beauty  (AKA stop and smell the roses) Take a look outside, even in mid-winter. Notice colors, textures. Watch the squirrels and birds.     Service to others  Be of service to others.  There is always someone else in need.  By helping others we can  get out of ourselves  and remember the really important things.  This also provides opportunities for practicing all the other parts of the program.    Humor  Laugh and be silly!  Adjust your TV habits for less news and crime-drama and more comedy.    Control your stress  Try breathing deep, massage therapy, biofeedback, and meditation. Find time to relax each day.     My support system    Clinic Contact:  Phone number:    Contact 1:  Phone number:    Contact 2:  Phone number:    Yazdanism/:  Phone number:    Therapist:  Phone number:    Local crisis center:    Phone number:    Other community support:  Phone number:

## 2017-11-02 NOTE — LETTER
My Asthma Action Plan  Name: Alka Casey   YOB: 1990  Date: 11/2/2017   My doctor: Rojelio Ivey MD   My clinic: [facility]        My Control Medicine: Montelukast (Singulair) -  10 mg 1 tablet daily  My Rescue Medicine: Levalbuterol (Xopenex) inhaler :  2 puffs every 4 hours as needed, or as directed below:  My Asthma Severity: mild persistent  Avoid your asthma triggers.               GREEN ZONE   Good Control    I feel good    No cough or wheeze    Can work, sleep and play without asthma symptoms       Take your asthma control medicine every day.     1. If exercise triggers your asthma, take your rescue medication    15 minutes before exercise or sports, and    During exercise if you have asthma symptoms  2. Spacer to use with inhaler: If you have a spacer, make sure to use it with your inhaler             YELLOW ZONE Getting Worse  I have ANY of these:    I do not feel good    Cough or wheeze    Chest feels tight    Wake up at night   1. Keep taking your Green Zone medications  2. Start taking your rescue medicine:    every 20 minutes for up to 1 hour. Then every 4 hours for 24-48 hours.  3. If you stay in the Yellow Zone for more than 12-24 hours, contact your doctor.  4. If you do not return to the Green Zone in 12-24 hours or you get worse, start taking your oral steroid medicine if prescribed by your provider.           RED ZONE Medical Alert - Get Help  I have ANY of these:    I feel awful    Medicine is not helping    Breathing getting harder    Trouble walking or talking    Nose opens wide to breathe       1. Take your rescue medicine NOW  2. If your provider has prescribed an oral steroid medicine, start taking it NOW  3. Call your doctor NOW  4. If you are still in the Red Zone after 20 minutes and you have not reached your doctor:    Take your rescue medicine again and    Call 911 or go to the emergency room right away    See your regular doctor within 2 weeks  of an Emergency Room or Urgent Care visit for follow-up treatment.        Electronically signed by: Linda Hooker, November 2, 2017    Annual Reminders:  Meet with Asthma Educator,  Flu Shot in the Fall, consider Pneumonia Vaccination for patients with asthma (aged 19 and older).    Pharmacy:    [id number] - [place] - [id number] [place] [id number] - [place] - [address]                    Asthma Triggers  How To Control Things That Make Your Asthma Worse    Triggers are things that make your asthma worse.  Look at the list below to help you find your triggers and what you can do about them.  You can help prevent asthma flare-ups by staying away from your triggers.      Trigger                                                          What you can do   Cigarette Smoke  Tobacco smoke can make asthma worse. Do not allow smoking in your home, car or around you.  Be sure no one smokes at a child s day care or school.  If you smoke, ask your health care provider for ways to help you quit.  Ask family members to quit too.  Ask your health care provider for a referral to Quit Plan to help you quit smoking, or call 1-490-898-PLAN.     Colds, Flu, Bronchitis  These are common triggers of asthma. Wash your hands often.  Don t touch your eyes, nose or mouth.  Get a flu shot every year.     Dust Mites  These are tiny bugs that live in cloth or carpet. They are too small to see. Wash sheets and blankets in hot water every week.   Encase pillows and mattress in dust mite proof covers.  Avoid having carpet if you can. If you have carpet, vacuum weekly.   Use a dust mask and HEPA vacuum.   Pollen and Outdoor Mold  Some people are allergic to trees, grass, or weed pollen, or molds. Try to keep your windows closed.  Limit time out doors when pollen count is high.   Ask you health care provider about taking medicine during allergy season.     Animal Dander  Some people are allergic to skin  flakes, urine or saliva from pets with fur or feathers. Keep pets with fur or feathers out of your home.    If you can t keep the pet outdoors, then keep the pet out of your bedroom.  Keep the bedroom door closed.  Keep pets off cloth furniture and away from stuffed toys.     Mice, Rats, and Cockroaches  Some people are allergic to the waste from these pests.   Cover food and garbage.  Clean up spills and food crumbs.  Store grease in the refrigerator.   Keep food out of the bedroom.   Indoor Mold  This can be a trigger if your home has high moisture. Fix leaking faucets, pipes, or other sources of water.   Clean moldy surfaces.  Dehumidify basement if it is damp and smelly.   Smoke, Strong Odors, and Sprays  These can reduce air quality. Stay away from strong odors and sprays, such as perfume, powder, hair spray, paints, smoke incense, paint, cleaning products, candles and new carpet.   Exercise or Sports  Some people with asthma have this trigger. Be active!  Ask your doctor about taking medicine before sports or exercise to prevent symptoms.    Warm up for 5-10 minutes before and after sports or exercise.     Other Triggers of Asthma  Cold air:  Cover your nose and mouth with a scarf.  Sometimes laughing or crying can be a trigger.  Some medicines and food can trigger asthma.

## 2017-11-02 NOTE — NURSING NOTE
"Chief Complaint   Patient presents with     Physical       Initial /76 (BP Location: Left arm, Patient Position: Chair, Cuff Size: Adult Regular)  Pulse 77  Temp 98.5  F (36.9  C) (Oral)  Ht 5' 3.75\" (1.619 m)  Wt 177 lb (80.3 kg)  SpO2 99%  BMI 30.62 kg/m2 Estimated body mass index is 30.62 kg/(m^2) as calculated from the following:    Height as of this encounter: 5' 3.75\" (1.619 m).    Weight as of this encounter: 177 lb (80.3 kg).  Medication Reconciliation: complete     Cruzito Holguin MA      "

## 2017-11-02 NOTE — PATIENT INSTRUCTIONS
Preventive Health Recommendations  Female Ages 26 - 39  Yearly exam:   See your health care provider every year in order to    Review health changes.     Discuss preventive care.      Review your medicines if you your doctor has prescribed any.    Until age 30: Get a Pap test every three years (more often if you have had an abnormal result).    After age 30: Talk to your doctor about whether you should have a Pap test every 3 years or have a Pap test with HPV screening every 5 years.   You do not need a Pap test if your uterus was removed (hysterectomy) and you have not had cancer.  You should be tested each year for STDs (sexually transmitted diseases), if you're at risk.   Talk to your provider about how often to have your cholesterol checked.  If you are at risk for diabetes, you should have a diabetes test (fasting glucose).  Shots: Get a flu shot each year. Get a tetanus shot every 10 years.   Nutrition:     Eat at least 5 servings of fruits and vegetables each day.    Eat whole-grain bread, whole-wheat pasta and brown rice instead of white grains and rice.    Talk to your provider about Calcium and Vitamin D.     Lifestyle    Exercise at least 150 minutes a week (30 minutes a day, 5 days of the week). This will help you control your weight and prevent disease.    Limit alcohol to one drink per day.    No smoking.     Wear sunscreen to prevent skin cancer.    See your dentist every six months for an exam and cleaning.  At [facility], we strive to deliver an exceptional experience to you, every time we see you.  If you receive a survey in the mail, please send us back your thoughts. We really do value your feedback.    Based on your medical history, these are the current health maintenance/preventive care services that you are due for (some may have been done at this visit.)  Health Maintenance Due   Topic Date Due     ASTHMA CONTROL TEST Q6 MOS  06/01/2017     PHQ-9 Q6 MONTHS  06/01/2017      INFLUENZA VACCINE (SYSTEM ASSIGNED)  09/01/2017     ASTHMA ACTION PLAN Q1 YR  12/01/2017     DEPRESSION ACTION PLAN Q1 YR  12/01/2017         Suggested websites for health information:  Www.[facility].org : Up to date and easily searchable information on multiple topics.  Www.medlineplus.gov : medication info, interactive tutorials, watch real surgeries online  Www.familydoctor.org : good info from the Academy of Family Physicians  Www.cdc.gov : public health info, travel advisories, epidemics (H1N1)  Www.aap.org : children's health info, normal development, vaccinations  Www.health.[facility].[place].us : [place] dept of health, public health issues in [place], N1N1    Your care team:                            Family Medicine Internal Medicine   MD Srini Angel MD Shantel Branch-Fleming, MD Katya Georgiev PA-C Nam Ho, MD Pediatrics   MALU Dickey, MD Radha Glynn CNP, MD Deborah Mielke, MD Kim Thein, APRN CNP      Clinic hours: Monday - Thursday 7 am-7 pm; Fridays 7 am-5 pm.   Urgent care: Monday - Friday 11 am-9 pm; Saturday and Sunday 9 am-5 pm.  Pharmacy : Monday -Thursday 8 am-8 pm; Friday 8 am-6 pm; Saturday and Sunday 9 am-5 pm.     Clinic: [phone]   Pharmacy: [phone]

## 2017-11-03 ASSESSMENT — ANXIETY QUESTIONNAIRES
6. BECOMING EASILY ANNOYED OR IRRITABLE: MORE THAN HALF THE DAYS
5. BEING SO RESTLESS THAT IT IS HARD TO SIT STILL: NOT AT ALL
1. FEELING NERVOUS, ANXIOUS, OR ON EDGE: MORE THAN HALF THE DAYS
7. FEELING AFRAID AS IF SOMETHING AWFUL MIGHT HAPPEN: NOT AT ALL
2. NOT BEING ABLE TO STOP OR CONTROL WORRYING: MORE THAN HALF THE DAYS
3. WORRYING TOO MUCH ABOUT DIFFERENT THINGS: SEVERAL DAYS
IF YOU CHECKED OFF ANY PROBLEMS ON THIS QUESTIONNAIRE, HOW DIFFICULT HAVE THESE PROBLEMS MADE IT FOR YOU TO DO YOUR WORK, TAKE CARE OF THINGS AT HOME, OR GET ALONG WITH OTHER PEOPLE: SOMEWHAT DIFFICULT
GAD7 TOTAL SCORE: 8

## 2017-11-03 ASSESSMENT — PATIENT HEALTH QUESTIONNAIRE - PHQ9
SUM OF ALL RESPONSES TO PHQ QUESTIONS 1-9: 9
5. POOR APPETITE OR OVEREATING: SEVERAL DAYS

## 2017-11-04 ASSESSMENT — ANXIETY QUESTIONNAIRES: GAD7 TOTAL SCORE: 8

## 2017-11-04 ASSESSMENT — ASTHMA QUESTIONNAIRES: ACT_TOTALSCORE: 25

## 2018-03-23 ENCOUNTER — OFFICE VISIT (OUTPATIENT)
Dept: RHEUMATOLOGY | Facility: CLINIC | Age: 28
End: 2018-03-23
Payer: COMMERCIAL

## 2018-03-23 VITALS
TEMPERATURE: 98.8 F | SYSTOLIC BLOOD PRESSURE: 117 MMHG | BODY MASS INDEX: 29.66 KG/M2 | WEIGHT: 178 LBS | HEART RATE: 87 BPM | OXYGEN SATURATION: 97 % | DIASTOLIC BLOOD PRESSURE: 78 MMHG | HEIGHT: 65 IN

## 2018-03-23 DIAGNOSIS — K20.0 EOSINOPHILIC ESOPHAGITIS: ICD-10-CM

## 2018-03-23 DIAGNOSIS — M08.00 JUVENILE RHEUMATOID ARTHRITIS (H): ICD-10-CM

## 2018-03-23 DIAGNOSIS — R82.81 PYURIA: ICD-10-CM

## 2018-03-23 DIAGNOSIS — Z79.60 LONG-TERM USE OF IMMUNOSUPPRESSANT MEDICATION: ICD-10-CM

## 2018-03-23 DIAGNOSIS — E55.9 VITAMIN D INSUFFICIENCY: ICD-10-CM

## 2018-03-23 PROCEDURE — 99214 OFFICE O/P EST MOD 30 MIN: CPT | Performed by: INTERNAL MEDICINE

## 2018-03-23 RX ORDER — HYDROXYCHLOROQUINE SULFATE 200 MG/1
200 TABLET, FILM COATED ORAL 2 TIMES DAILY
Qty: 180 TABLET | Refills: 3 | Status: CANCELLED | OUTPATIENT
Start: 2018-03-23

## 2018-03-23 RX ORDER — ERGOCALCIFEROL 1.25 MG/1
50000 CAPSULE, LIQUID FILLED ORAL
Qty: 3 CAPSULE | Refills: 3 | Status: CANCELLED | OUTPATIENT
Start: 2018-03-23

## 2018-03-23 ASSESSMENT — ROUTINE ASSESSMENT OF PATIENT INDEX DATA (RAPID3)
TOTAL RAPID3 SCORE: 3.8
RAPID3 INTERPRETATION: LOW 3.1-6

## 2018-03-23 ASSESSMENT — PAIN SCALES - GENERAL: PAINLEVEL: MILD PAIN (2)

## 2018-03-23 NOTE — NURSING NOTE
"Alka Casey's goals for this visit include: Juvenile rheumatoid arthritis   She requests these members of her care team be copied on today's visit information: yes     PCP: Rojelio Ivey    Referring Provider:  No referring provider defined for this encounter.    Chief Complaint   Patient presents with     RECHECK       Initial /78 (BP Location: Left arm, Patient Position: Chair, Cuff Size: Adult Regular)  Pulse 87  Temp 98.8  F (37.1  C) (Oral)  Ht 1.638 m (5' 4.5\")  Wt 80.7 kg (178 lb)  SpO2 97%  BMI 30.08 kg/m2 Estimated body mass index is 30.08 kg/(m^2) as calculated from the following:    Height as of this encounter: 1.638 m (5' 4.5\").    Weight as of this encounter: 80.7 kg (178 lb).  Medication Reconciliation: complete    Do you need any medication refills at today's visit? yes    "

## 2018-03-23 NOTE — MR AVS SNAPSHOT
After Visit Summary   3/23/2018    Alka Casey    MRN: [id number]           Patient Information     Date Of Birth          1990        Visit Information        Provider Department      3/23/2018 8:00 AM Chris Duff MD [facility]        Today's Diagnoses     Juvenile rheumatoid arthritis (H)        Long-term use of immunosuppressant medication        Vitamin D insufficiency        Eosinophilic esophagitis        Pyuria        Juvenile rheumatoid arthritis           Follow-ups after your visit        Follow-up notes from your care team     Return in about 6 months (around 9/23/2018).      Your next 10 appointments already scheduled     Sep 28, 2018  8:00 AM CDT   Return Visit with Chris Duff MD   [facility] ([facility])    [address] [place] [id number] [phone]              Who to contact     If you have questions or need follow up information about today's clinic visit or your schedule please contact [facility] directly at [phone].  Normal or non-critical lab and imaging results will be communicated to you by [employer], letter or phone within 4 business days after the clinic has received the results. If you do not hear from us within 7 days, please contact the clinic through [phone] or phone. If you have a critical or abnormal lab result, we will notify you by phone as soon as possible.  Submit refill requests through [employer] or call your pharmacy and they will forward the refill request to us. Please allow 3 business days for your refill to be completed.          Additional Information About Your Visit        [employer] Information     [employer] gives you secure access to your electronic health record. If you see a primary care provider, you can also send messages to your care team and make appointments. If you have questions, please call your primary care clinic.  If you do not have a  "primary care provider, please call [phone] and they will assist you.      [employer] is an electronic gateway that provides easy, online access to your medical records. With [employer], you can request a clinic appointment, read your test results, renew a prescription or communicate with your care team.     To access your existing account, please contact your [facility] or call [phone] for assistance.        Care EveryWhere ID     This is your Care EveryWhere ID. This could be used by other organizations to access your [place] medical records  [id number]        Your Vitals Were     Pulse Temperature Height Pulse Oximetry BMI (Body Mass Index)       87 98.8  F (37.1  C) (Oral) 1.638 m (5' 4.5\") 97% 30.08 kg/m2        Blood Pressure from Last 3 Encounters:   03/23/18 117/78   11/02/17 124/76   10/20/17 110/74    Weight from Last 3 Encounters:   03/23/18 80.7 kg (178 lb)   11/02/17 80.3 kg (177 lb)   10/20/17 79.4 kg (175 lb 1.6 oz)              Today, you had the following     No orders found for display       Primary Care Provider Office Phone # Fax #    Rojelio Ivey [phone]       [id number] [place]  [facility] [address]        Equal Access to Services     [facility] : Hadii aad ku hadasho Soomaali, waaxda luqadaha, qaybta kaalmada adeegyada, vale richmond. So [facility] [phone].    ATENCIÓN: Si habla español, tiene a peñaloza disposición servicios gratuitos de asistencia lingüística. Llame al [phone].    We comply with applicable federal civil rights laws and [place] laws. We do not discriminate on the basis of race, color, national origin, age, disability, sex, sexual orientation, or gender identity.            Thank you!     Thank you for choosing [facility]  for your care. Our goal is always to provide you with excellent care. Hearing back from our patients is one way we can continue to improve our " services. Please take a few minutes to complete the written survey that you may receive in the mail after your visit with us. Thank you!             Your Updated Medication List - Protect others around you: Learn how to safely use, store and throw away your medicines at www.disposemymeds.org.          This list is accurate as of 3/23/18  8:43 AM.  Always use your most recent med list.                   Brand Name Dispense Instructions for use Diagnosis    hydroxychloroquine 200 MG tablet    PLAQUENIL    180 tablet    Take 1 tablet (200 mg) by mouth 2 times daily    Juvenile rheumatoid arthritis (H), Long-term use of immunosuppressant medication, Vitamin D insufficiency, Eosinophilic esophagitis, Pyuria, Juvenile rheumatoid arthritis (H)       levalbuterol 45 MCG/ACT Inhaler    XOPENEX HFA    1 Inhaler    Inhale 2 puffs into the lungs every 4 hours as needed for asthma symptoms.    Mild persistent asthma without complication       levocetirizine 5 MG tablet    XYZAL    90 tablet    Take 1 tablet (5 mg) by mouth daily for allergy prevention.    Perennial allergic rhinitis with seasonal variation       montelukast 10 MG tablet    SINGULAIR    90 tablet    Take 1 tablet (10 mg) by mouth daily for asthma prevention.    Mild persistent asthma without complication       norgestrel-ethinyl estradiol 0.5-50 MG-MCG per tablet    OVRAL    84 tablet    Take 1 tablet by mouth daily    Surveillance of previously prescribed contraceptive pill       prenatal multivitamin  plus iron 27-1 MG Tabs     90 tablet    Take 1 tablet by mouth daily while pregnant or trying to become pregnant.    Pre-conception counseling       sertraline 100 MG tablet    ZOLOFT    90 tablet    Take 1 tablet (100 mg) by mouth daily for depression.    Major depressive disorder, recurrent episode, mild (H)       vitamin D 30249 UNIT capsule    ERGOCALCIFEROL    3 capsule    Take 1 capsule (50,000 Units) by mouth every 30 days    Juvenile rheumatoid arthritis  (H), Long-term use of immunosuppressant medication, Vitamin D insufficiency, Eosinophilic esophagitis, Pyuria, Juvenile rheumatoid arthritis (H)

## 2018-03-23 NOTE — PROGRESS NOTES
Ms. 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 -DELL, -CCP, -RF, -Ro. No erosions.    She can swell in her fingers and hands. Her Left ankle can intermittently be painful following a severe sprain. No joint deformities, or dysfunction. No redness or warmth to report. She can get bilateral outside hip soreness at times. She will occasionally use 600 mg for breakthrough. Energy is okay. Modest AM stiffness.     Hydroxychloroquine 400 mg daily with normal eye check . Vitamin D 50,000 units every 30 days. She is not contemplating pregnancy. Her OB has stated that she wants Alka to stop her Vitamin D and hydroxychloroquine during pregnancy.    PMI:  Medical-asthma, juvenile arthritis, low back facet lumbar syndrome, chronic depression, eosinophilic esophagitis, vitamin D insufficiency  Surgical-wisdom tooth extractions  Injuries-toe fractures    FH:  GM with CHF  GM with colon cancer  Aunts, uncles with RA  Mother with OA, dysrhythmia, depression, and fibromyalgia  Father with dysrhythmia, allergies and foot  Sister is chronic anxiety    SH:  Works as an , training for [employer]. , no children. No tobacco, social EtOH, no illicit drugs. No exposures to infectious hepatitis or HIV. Right handed.     PMSF history personally obtained and updated by me in this clinic visit.    ROS:  +headaches on occasion  +farsightedness  +mild dry eyes  +dysphagia and odynophagia rarely  +stomach cramps and diarrhea intermittently  +allergies to pets and seasonal allergies  Remainder of the 14 point ROS obtained and found negative.    Physical Exam:  Constitutional: WD-WN-WG cooperative  Eyes: nl EOM, PERRLA, vision, conjunctiva, sclera  ENT: nl external ears, nose, hearing, lips, teeth, gums, throat; +modest parotid swelling  Neck: no mass or thyroid enlargement  Resp: lungs clear to auscultation, nl to palpation, nl effort  CV: RRR, no murmurs, rubs or gallops, no  edema  GI: no ABD mass or tenderness, no HSM  : not tested  Lymph: no cervical or epitrochlear nodes  MS:  +Trace bilateral wrist swelling L>R. All other TMJ, neck, shoulder, elbow, wrist, hand, spine, hip, knee, ankle, and foot joints were examined and otherwise found normal. Normal  strength. Full ROM. Normal tuck and prayer.  Skin: no nail pitting, alopecia, rash  Neuro: nl cranial nerves, strength, sensation, DTRs.   Psych: nl judgement, orientation, memory, affect.    Laboratory:    Component      Latest Ref Rng & Units 10/20/2017   WBC      4.0 - 11.0 10e9/L 4.8   RBC Count      3.8 - 5.2 10e12/L 4.80   Hemoglobin      11.7 - 15.7 g/dL 14.4   Hematocrit      35.0 - 47.0 % 41.3   MCV      78 - 100 fl 86   MCH      26.5 - 33.0 pg 30.0   MCHC      31.5 - 36.5 g/dL 34.9   RDW      10.0 - 15.0 % 13.0   Platelet Count      150 - 450 10e9/L 236   Diff Method       Automated Method   % Neutrophils      % 49.4   % Lymphocytes      % 33.9   % Monocytes      % 7.4   % Eosinophils      % 8.7   % Basophils      % 0.6   Absolute Neutrophil      1.6 - 8.3 10e9/L 2.4   Absolute Lymphocytes      0.8 - 5.3 10e9/L 1.6   Absolute Monocytes      0.0 - 1.3 10e9/L 0.4   Absolute Eosinophils      0.0 - 0.7 10e9/L 0.4   Absolute Basophils      0.0 - 0.2 10e9/L 0.0   Creatinine      0.52 - 1.04 mg/dL 0.70   GFR Estimate      >60 mL/min/1.7m2 >90   GFR Estimate If Black      >60 mL/min/1.7m2 >90   25 OH Vit D2      ug/L <5   25 OH Vit D3      ug/L 20   25 OH Vit D total      20 - 75 ug/L <25   AST      0 - 45 U/L 30   ALT      0 - 50 U/L 35   Albumin      3.4 - 5.0 g/dL 3.4     Component      Latest Ref Rng & Units 7/18/2014 12/5/2014 10/20/2017   25 OH Vit D2      ug/L   <5   25 OH Vit D3      ug/L   20   25 OH Vit D total      20 - 75 ug/L   <25   Cyclic Cit Pept IgG/IgA      <20 UNITS <20 . . .     DELL Screen by EIA      <1.0 <1.0 . . .     Rheumatoid Factor      <20 IU/mL <20     SSA (Ro) (ART) Antibody, IgG      0.0 - 0.9 AI   <0.2 . . .        Impression:    Pauciarticular Chronic Juvenile Rheumatoid Arthritis-with very modest joint disease and sicca symptoms. It is my impression that she will tolerate well and holiday from the hydroxychloroquine as she attempts pregnancy. We agree to stop they hydroxychloroquine and ibuprofen now. Use tylenol for prn analgesia. Anticipate the possibility of prednisone use during pregnancy or after delivery.    Eosinophilic esophagitis-stable dysphagia.    Long term monitoring of immunosuppression-with normal eye check and stable labs.    Plan RTC in 6 months.

## 2018-05-24 ENCOUNTER — OFFICE VISIT (OUTPATIENT)
Dept: FAMILY MEDICINE | Facility: CLINIC | Age: 28
End: 2018-05-24
Payer: COMMERCIAL

## 2018-05-24 VITALS
HEART RATE: 79 BPM | HEIGHT: 65 IN | OXYGEN SATURATION: 100 % | SYSTOLIC BLOOD PRESSURE: 118 MMHG | TEMPERATURE: 98.6 F | DIASTOLIC BLOOD PRESSURE: 69 MMHG | WEIGHT: 180 LBS | BODY MASS INDEX: 29.99 KG/M2

## 2018-05-24 DIAGNOSIS — R53.83 FATIGUE, UNSPECIFIED TYPE: ICD-10-CM

## 2018-05-24 DIAGNOSIS — L65.9 HAIR LOSS: Primary | ICD-10-CM

## 2018-05-24 DIAGNOSIS — M08.00 JUVENILE RHEUMATOID ARTHRITIS (H): ICD-10-CM

## 2018-05-24 DIAGNOSIS — K20.0 EOSINOPHILIC ESOPHAGITIS: ICD-10-CM

## 2018-05-24 DIAGNOSIS — N92.6 IRREGULAR MENSES: ICD-10-CM

## 2018-05-24 DIAGNOSIS — R82.81 PYURIA: ICD-10-CM

## 2018-05-24 DIAGNOSIS — E55.9 VITAMIN D INSUFFICIENCY: ICD-10-CM

## 2018-05-24 DIAGNOSIS — Z79.60 LONG-TERM USE OF IMMUNOSUPPRESSANT MEDICATION: ICD-10-CM

## 2018-05-24 DIAGNOSIS — Z11.4 SCREENING FOR HUMAN IMMUNODEFICIENCY VIRUS: ICD-10-CM

## 2018-05-24 DIAGNOSIS — F33.0 MAJOR DEPRESSIVE DISORDER, RECURRENT EPISODE, MILD (H): ICD-10-CM

## 2018-05-24 DIAGNOSIS — R63.5 WEIGHT GAIN: ICD-10-CM

## 2018-05-24 LAB
ALBUMIN SERPL-MCNC: 3.8 G/DL (ref 3.4–5)
ALT SERPL W P-5'-P-CCNC: 74 U/L (ref 0–50)
AST SERPL W P-5'-P-CCNC: 52 U/L (ref 0–45)
CREAT SERPL-MCNC: 0.73 MG/DL (ref 0.52–1.04)
ERYTHROCYTE [DISTWIDTH] IN BLOOD BY AUTOMATED COUNT: 13.2 % (ref 10–15)
FERRITIN SERPL-MCNC: 30 NG/ML (ref 12–150)
FSH SERPL-ACNC: 1.7 IU/L
GFR SERPL CREATININE-BSD FRML MDRD: >90 ML/MIN/1.7M2
GLUCOSE SERPL-MCNC: 102 MG/DL (ref 70–99)
HBA1C MFR BLD: 5.1 % (ref 0–5.6)
HCG SERPL QL: NEGATIVE
HCT VFR BLD AUTO: 40.9 % (ref 35–47)
HGB BLD-MCNC: 14 G/DL (ref 11.7–15.7)
MCH RBC QN AUTO: 29.2 PG (ref 26.5–33)
MCHC RBC AUTO-ENTMCNC: 34.2 G/DL (ref 31.5–36.5)
MCV RBC AUTO: 85 FL (ref 78–100)
PLATELET # BLD AUTO: 243 10E9/L (ref 150–450)
PROLACTIN SERPL-MCNC: 21 UG/L (ref 3–27)
RBC # BLD AUTO: 4.79 10E12/L (ref 3.8–5.2)
TSH SERPL DL<=0.005 MIU/L-ACNC: 3.04 MU/L (ref 0.4–4)
WBC # BLD AUTO: 6.3 10E9/L (ref 4–11)

## 2018-05-24 PROCEDURE — 83001 ASSAY OF GONADOTROPIN (FSH): CPT | Performed by: FAMILY MEDICINE

## 2018-05-24 PROCEDURE — 84146 ASSAY OF PROLACTIN: CPT | Performed by: FAMILY MEDICINE

## 2018-05-24 PROCEDURE — 36415 COLL VENOUS BLD VENIPUNCTURE: CPT | Performed by: FAMILY MEDICINE

## 2018-05-24 PROCEDURE — 82947 ASSAY GLUCOSE BLOOD QUANT: CPT | Performed by: FAMILY MEDICINE

## 2018-05-24 PROCEDURE — 84450 TRANSFERASE (AST) (SGOT): CPT | Performed by: INTERNAL MEDICINE

## 2018-05-24 PROCEDURE — 87389 HIV-1 AG W/HIV-1&-2 AB AG IA: CPT | Performed by: FAMILY MEDICINE

## 2018-05-24 PROCEDURE — 82565 ASSAY OF CREATININE: CPT | Performed by: INTERNAL MEDICINE

## 2018-05-24 PROCEDURE — 85027 COMPLETE CBC AUTOMATED: CPT | Performed by: INTERNAL MEDICINE

## 2018-05-24 PROCEDURE — 99213 OFFICE O/P EST LOW 20 MIN: CPT | Performed by: FAMILY MEDICINE

## 2018-05-24 PROCEDURE — 84460 ALANINE AMINO (ALT) (SGPT): CPT | Performed by: INTERNAL MEDICINE

## 2018-05-24 PROCEDURE — 84403 ASSAY OF TOTAL TESTOSTERONE: CPT | Performed by: FAMILY MEDICINE

## 2018-05-24 PROCEDURE — 82728 ASSAY OF FERRITIN: CPT | Performed by: FAMILY MEDICINE

## 2018-05-24 PROCEDURE — 84443 ASSAY THYROID STIM HORMONE: CPT | Performed by: FAMILY MEDICINE

## 2018-05-24 PROCEDURE — 83036 HEMOGLOBIN GLYCOSYLATED A1C: CPT | Performed by: FAMILY MEDICINE

## 2018-05-24 PROCEDURE — 84703 CHORIONIC GONADOTROPIN ASSAY: CPT | Performed by: FAMILY MEDICINE

## 2018-05-24 PROCEDURE — 82040 ASSAY OF SERUM ALBUMIN: CPT | Performed by: INTERNAL MEDICINE

## 2018-05-24 RX ORDER — MONTELUKAST SODIUM 10 MG/1
1 TABLET ORAL DAILY
Qty: 90 TABLET | Refills: 3 | Status: CANCELLED | OUTPATIENT
Start: 2018-05-24

## 2018-05-24 RX ORDER — SERTRALINE HYDROCHLORIDE 100 MG/1
100 TABLET, FILM COATED ORAL DAILY
Qty: 90 TABLET | Refills: 1 | Status: SHIPPED | OUTPATIENT
Start: 2018-05-24 | End: 2018-11-27

## 2018-05-24 RX ORDER — LEVOCETIRIZINE DIHYDROCHLORIDE 5 MG/1
5 TABLET, FILM COATED ORAL DAILY
Qty: 90 TABLET | Refills: 3 | Status: CANCELLED | OUTPATIENT
Start: 2018-05-24

## 2018-05-24 ASSESSMENT — ANXIETY QUESTIONNAIRES
GAD7 TOTAL SCORE: 3
1. FEELING NERVOUS, ANXIOUS, OR ON EDGE: SEVERAL DAYS
2. NOT BEING ABLE TO STOP OR CONTROL WORRYING: SEVERAL DAYS
3. WORRYING TOO MUCH ABOUT DIFFERENT THINGS: NOT AT ALL
7. FEELING AFRAID AS IF SOMETHING AWFUL MIGHT HAPPEN: NOT AT ALL
IF YOU CHECKED OFF ANY PROBLEMS ON THIS QUESTIONNAIRE, HOW DIFFICULT HAVE THESE PROBLEMS MADE IT FOR YOU TO DO YOUR WORK, TAKE CARE OF THINGS AT HOME, OR GET ALONG WITH OTHER PEOPLE: SOMEWHAT DIFFICULT
6. BECOMING EASILY ANNOYED OR IRRITABLE: SEVERAL DAYS
5. BEING SO RESTLESS THAT IT IS HARD TO SIT STILL: NOT AT ALL

## 2018-05-24 ASSESSMENT — PATIENT HEALTH QUESTIONNAIRE - PHQ9: 5. POOR APPETITE OR OVEREATING: NOT AT ALL

## 2018-05-24 ASSESSMENT — PAIN SCALES - GENERAL: PAINLEVEL: NO PAIN (0)

## 2018-05-24 NOTE — PROGRESS NOTES
SUBJECTIVE:   Alka Casey is a 27 year old female who presents to clinic today for the following health issues:      Concern - Weight gain, hair loss & fatigue      When did it start?: about 1 year     Any strain/injury, other illness, or event to trigger this problem?   no    Previous history of similar problem:   no      Describe it:   Fatigue (irrelevant of how much or little sleep she gets, going off OCP has not changed this symptom, does not snore), Hair loss (no bald spots but will lose handfuls of hair after showering) & steady weight gain (despite working out every day and staying active and healthy eating habits). She tracks her caloric intake and calorie burning and states that she is burning more calories than she is taking in.  She also hasn't gotten pregnant even though her and her  have been trying for 6 months, but she notes that her mother was very fertile so this is unexpected for her. Her LMP was about 6 weeks ago but has been irregular since stopping OCP so she does not suspect she is pregnant. Denies increase in acne since stopping OCP.    Severity: moderate      Progression of symptoms from onset until now:  worsening      Accompanying Signs & Symptoms:  Fatigue, hair loss & Weight gain   What have you noticed that tends to make this worse?     None      What have you noticed that tends to make this better?     None      What have you done (this time) to try to treat this problem yourself?     None      Did it help?     no     PATIENT WANTS HER THYROID CHECKED     She has not been checked for thyroid problems before that she can recall.    Wt Readings from Last 5 Encounters:   05/24/18 81.6 kg (180 lb)   03/23/18 80.7 kg (178 lb)   11/02/17 80.3 kg (177 lb)   10/20/17 79.4 kg (175 lb 1.6 oz)   03/31/17 77.7 kg (171 lb 6.4 oz)      Past medical, family, and social histories, medications, and allergies are reviewed and updated in Epic.     ROS:  CONSTITUTIONAL: NEGATIVE for fever,  "chills  ENT/MOUTH: NEGATIVE for ear, mouth and throat problems  RESP: ACT = 25. NEGATIVE for significant cough or SOB  CV: NEGATIVE for chest pain, palpitations or peripheral edema  PSYCH: PHQ-9 = 5.  ROS otherwise negative    This document serves as a record of the services and decisions personally performed and made by Dr. Ivey. It was created on his behalf by Nhung Mckeon, a trained medical scribe. The creation of this document is based the provider's statements to the medical scribe.  Nhung Mckeon May 24, 2018 4:50 PM     OBJECTIVE:                                                    /69 (BP Location: Left arm, Patient Position: Chair, Cuff Size: Adult Regular)  Pulse 79  Temp 98.6  F (37  C) (Oral)  Ht 1.638 m (5' 4.5\")  Wt 81.6 kg (180 lb)  SpO2 100%  BMI 30.42 kg/m2   Body mass index is 30.42 kg/(m^2).     GENERAL: healthy, alert and no distress  EYES: Eyes grossly normal to inspection, PERRL, EOMI, sclerae white and conjunctivae normal  MS: no gross musculoskeletal defects noted, no edema  SKIN: no suspicious lesions or rashes  NEURO: Normal strength and tone, sensory exam grossly normal, mentation intact, oriented times 3 and cranial nerves 2-12 intact  PSYCH: mentation appears normal, affect normal/bright     Diagnostic Test Results:  none      ASSESSMENT/PLAN:                                                      (L65.9) Hair loss  (primary encounter diagnosis)  Comment: She denies alopecia, and I think telogen effluvium is the most likely cause. I'll check some labs to screen for triggers, but I think the OCP cessation is the most likely trigger.   Plan: TSH with free T4 reflex, Glucose, Hemoglobin         A1c, Ferritin        Telogen effluvium handout provided.     (R53.83) Fatigue, unspecified type  Comment: Non-specific symptom that can be associated with depression, JRA treatment, hormone changes (including stopping the OCP).   Plan: TSH with free T4 reflex, Ferritin        " Follow up as needed.     (R63.5) Weight gain  Comment: If this is not due to hypothyroidism, then I don't have a specific explanation.   Plan: TSH with free T4 reflex, Glucose, Hemoglobin         A1c        I offered her to be referred to the dietician. She may do this through her workplace, but she can ask for a referral later.     (N92.6) Irregular menses  Comment: Rule out PCOS. I wasn't able to elicit any other significant symptoms of hyperandrogenism.   Plan: Follicle stimulating hormone, Prolactin, HCG         qualitative, Testosterone, total, JUST IN CASE            (F33.0) Major depressive disorder, recurrent episode, mild (H)  Comment: Stable.  Plan: sertraline (ZOLOFT) 100 MG tablet        Follow up in early November.      (Z11.4) Screening for human immunodeficiency virus  Comment: indications for screening discussed with the patient   Plan: HIV Antigen Antibody Combo              The information in this document, created by the medical scribe for me, accurately reflects the services I personally performed and the decisions made by me. I have reviewed and approved this document for accuracy prior to leaving the patient care area. May 24, 2018 4:50 PM   Rojelio Ivey MD

## 2018-05-24 NOTE — MR AVS SNAPSHOT
After Visit Summary   5/24/2018    Alka Casey    MRN: [id number]           Patient Information     Date Of Birth          1990        Visit Information        Provider Department      5/24/2018 4:40 PM Rojelio Ivey MD [facility]        Today's Diagnoses     Hair loss    -  1    Fatigue, unspecified type        Weight gain        Irregular menses        Major depressive disorder, recurrent episode, mild (H)        Screening for human immunodeficiency virus          Care Instructions    At [facility], we strive to deliver an exceptional experience to you, every time we see you.  If you receive a survey in the mail, please send us back your thoughts. We really do value your feedback.    Based on your medical history, these are the current health maintenance/preventive care services that you are due for (some may have been done at this visit.)  Health Maintenance Due   Topic Date Due     HIV SCREEN (SYSTEM ASSIGNED)  07/17/2009     PHQ-9 Q6 MONTHS  05/02/2018     ASTHMA CONTROL TEST Q6 MOS  05/02/2018       Suggested websites for health information:  Www.[employer].org : Up to date and easily searchable information on multiple topics.  Www.medlineplus.gov : medication info, interactive tutorials, watch real surgeries online  Www.familydoctor.org : good info from the Academy of Family Physicians  Www.cdc.gov : public health info, travel advisories, epidemics (H1N1)  Www.aap.org : children's health info, normal development, vaccinations  Www.health.state[place] : [place] dept of health, public health issues in [place], N1N1    Your care team:                            Family Medicine Internal Medicine   MD Srini Angel MD Shantel Branch-Fleming, MD Katya Georgiev PA-C Megan Hill, APRN CASSIE Meléndez MD Pediatrics   MALU Dickey, MD Grazyna Tsai APRN CNP   MD Radha Bueno MD    MD Jeri Tran APRN [facility]      Clinic hours: Monday - Thursday 7 am-7 pm; Fridays 7 am-5 pm.   Urgent care: Monday - Friday 11 am-9 pm; Saturday and Sunday 9 am-5 pm.  Pharmacy : Monday -Thursday 8 am-8 pm; Friday 8 am-6 pm; Saturday and Sunday 9 am-5 pm.     Clinic: [phone]   Pharmacy: [phone]              Follow-ups after your visit        Follow-up notes from your care team     Return in about 5 months (around 11/2/2018).      Your next 10 appointments already scheduled     Sep 28, 2018  8:00 AM CDT   Return Visit with Chris Duff MD   [facility] ([facility]    [address] [place] [id number] [phone]              Who to contact     If you have questions or need follow up information about today's clinic visit or your schedule please contact [facility] directly at [phone].  Normal or non-critical lab and imaging results will be communicated to you by MyChart, letter or phone within 4 business days after the clinic has received the results. If you do not hear from us within 7 days, please contact the clinic through [employer] or phone. If you have a critical or abnormal lab result, we will notify you by phone as soon as possible.  Submit refill requests through [employer] or call your pharmacy and they will forward the refill request to us. Please allow 3 business days for your refill to be completed.          Additional Information About Your Visit        MyChart Information     [employer] gives you secure access to your electronic health record. If you see a primary care provider, you can also send messages to your care team and make appointments. If you have questions, please call your primary care clinic.  If you do not have a primary care provider, please call [phone] and they will assist you.        Care EveryWhere ID     This is your Care EveryWhere ID. This could be used by other  "organizations to access your [place] medical records  [id number]        Your Vitals Were     Pulse Temperature Height Pulse Oximetry BMI (Body Mass Index)       79 98.6  F (37  C) (Oral) 5' 4.5\" (1.638 m) 100% 30.42 kg/m2        Blood Pressure from Last 3 Encounters:   05/24/18 118/69   03/23/18 117/78   11/02/17 124/76    Weight from Last 3 Encounters:   05/24/18 180 lb (81.6 kg)   03/23/18 178 lb (80.7 kg)   11/02/17 177 lb (80.3 kg)              We Performed the Following     Ferritin     Follicle stimulating hormone     Glucose     HCG qualitative     Hemoglobin A1c     HIV Antigen Antibody Combo     JUST IN CASE     Prolactin     Testosterone, total     TSH with free T4 reflex          Where to get your medicines      These medications were sent to [id number] - [place], [place] - [address]  [address] [address]     Phone:  [phone]     sertraline 100 MG tablet          Primary Care Provider Office Phone # Fax #    Rojelio Ivey [phone]       [address] [place]  [facility] [address]        Equal Access to Services     SAMMIE LANTIGUA AH: Hadii aad ku hadasho Soomaali, waaxda luqadaha, qaybta kaalmada adeegyada, vale richmond. So [facility] [phone].    ATENCIÓN: Si habla español, tiene a peñaloza disposición servicios gratuitos de asistencia lingüística. Jag al [phone].    We comply with applicable federal civil rights laws and [place] laws. We do not discriminate on the basis of race, color, national origin, age, disability, sex, sexual orientation, or gender identity.            Thank you!     Thank you for choosing [facility]  for your care. Our goal is always to provide you with excellent care. Hearing back from our patients is one way we can continue to improve our services. Please take a few minutes to complete the written survey that you may receive in the mail after your visit with us. " Thank you!             Your Updated Medication List - Protect others around you: Learn how to safely use, store and throw away your medicines at www.disposemymeds.org.          This list is accurate as of 5/24/18  5:11 PM.  Always use your most recent med list.                   Brand Name Dispense Instructions for use Diagnosis    hydroxychloroquine 200 MG tablet    PLAQUENIL    180 tablet    Take 1 tablet (200 mg) by mouth 2 times daily    Juvenile rheumatoid arthritis (H), Long-term use of immunosuppressant medication, Vitamin D insufficiency, Eosinophilic esophagitis, Pyuria, Juvenile rheumatoid arthritis (H)       levalbuterol 45 MCG/ACT Inhaler    XOPENEX HFA    1 Inhaler    Inhale 2 puffs into the lungs every 4 hours as needed for asthma symptoms.    Mild persistent asthma without complication       levocetirizine 5 MG tablet    XYZAL    90 tablet    Take 1 tablet (5 mg) by mouth daily for allergy prevention.    Perennial allergic rhinitis with seasonal variation       montelukast 10 MG tablet    SINGULAIR    90 tablet    Take 1 tablet (10 mg) by mouth daily for asthma prevention.    Mild persistent asthma without complication       prenatal multivitamin  plus iron 27-1 MG Tabs     90 tablet    Take 1 tablet by mouth daily while pregnant or trying to become pregnant.    Pre-conception counseling       sertraline 100 MG tablet    ZOLOFT    90 tablet    Take 1 tablet (100 mg) by mouth daily for depression.    Major depressive disorder, recurrent episode, mild (H)

## 2018-05-24 NOTE — PATIENT INSTRUCTIONS
At [facility], we strive to deliver an exceptional experience to you, every time we see you.  If you receive a survey in the mail, please send us back your thoughts. We really do value your feedback.    Based on your medical history, these are the current health maintenance/preventive care services that you are due for (some may have been done at this visit.)  Health Maintenance Due   Topic Date Due     HIV SCREEN (SYSTEM ASSIGNED)  07/17/2009     PHQ-9 Q6 MONTHS  05/02/2018     ASTHMA CONTROL TEST Q6 MOS  05/02/2018       Suggested websites for health information:  Www.Physician Referral Network (PRN)."[employer] : Up to date and easily searchable information on multiple topics.  Www.medlineplus.gov : medication info, interactive tutorials, watch real surgeries online  Www.familydoctor.org : good info from the Academy of Family Physicians  Www.cdc.gov : public health info, travel advisories, epidemics (H1N1)  Www.aap.org : children's health info, normal development, vaccinations  Www.health.[facility].[place].us : [place] dept of health, public health issues in [place], N1N1    Your care team:                            Family Medicine Internal Medicine   MD Srini Angel MD Shantel Branch-Fleming, MD Katya Georgiev PA-C Megan Hill, APRN CASSIE Meléndez MD Pediatrics   Surjit Valderrama PAAYLEEN Osuna, MD Grazyna Tsai APRN CNP   MD Radha Bueno MD Deborah Mielke, MD Kim Thein, APRN [facility]      Clinic hours: Monday - Thursday 7 am-7 pm; Fridays 7 am-5 pm.   Urgent care: Monday - Friday 11 am-9 pm; Saturday and Sunday 9 am-5 pm.  Pharmacy : Monday -Thursday 8 am-8 pm; Friday 8 am-6 pm; Saturday and Sunday 9 am-5 pm.     Clinic: [phone]   Pharmacy: [phone]

## 2018-05-25 LAB — HIV 1+2 AB+HIV1 P24 AG SERPL QL IA: NONREACTIVE

## 2018-05-25 ASSESSMENT — PATIENT HEALTH QUESTIONNAIRE - PHQ9: SUM OF ALL RESPONSES TO PHQ QUESTIONS 1-9: 5

## 2018-05-25 ASSESSMENT — ASTHMA QUESTIONNAIRES: ACT_TOTALSCORE: 25

## 2018-05-25 ASSESSMENT — ANXIETY QUESTIONNAIRES: GAD7 TOTAL SCORE: 3

## 2018-05-26 LAB — TESTOST SERPL-MCNC: 35 NG/DL (ref 8–60)

## 2018-06-08 ENCOUNTER — TELEPHONE (OUTPATIENT)
Dept: RHEUMATOLOGY | Facility: CLINIC | Age: 28
End: 2018-06-08

## 2018-06-08 DIAGNOSIS — M08.00 JUVENILE RHEUMATOID ARTHRITIS (H): Primary | ICD-10-CM

## 2018-06-08 NOTE — TELEPHONE ENCOUNTER
Message  Received: Today       Chris Duff MD  P [facility] Rheumatology Adult [place]                     Please help Alka schedule lab testing for about 3 weeks, and remind her that the hydroxychloroquine has been discontinued.              Spoke with patient, notified of Dr. Duff's recommendations. She will stop her Plaquenil today. Asked if positive pregnancy could cause elevated liver enzymes and if she needed to repeat labs. Will send to provider for review.      CHARLES SharpN, RN, PHN  Rheumatology Care Coordinator    [facility]

## 2018-06-11 NOTE — TELEPHONE ENCOUNTER
Assuming that she is stating that she is now pregnant, I don't believe such an early pregnancy would influence the liver function testing.

## 2018-06-12 ENCOUNTER — MYC MEDICAL ADVICE (OUTPATIENT)
Dept: RHEUMATOLOGY | Facility: CLINIC | Age: 28
End: 2018-06-12

## 2018-06-12 NOTE — TELEPHONE ENCOUNTER
1st attempt to contact patient, left detailed voicemail regarding Dr. Duff's recommendations noted on 6/11/18. Advised patient to complete lab testing in 3 weeks. [employer] message sent.     CHARLES SharpN, RN, PHN  Rheumatology Care Coordinator    [facility]

## 2018-06-15 NOTE — TELEPHONE ENCOUNTER
Patient notified of Dr. Duff's message noted below. She will repeat her labs after 6/29. She verbalizes understanding and agrees with plan.    CHARLES SharpN, RN, PHN  Rheumatology Care Coordinator    [facility]

## 2018-07-02 DIAGNOSIS — M08.00 JUVENILE RHEUMATOID ARTHRITIS (H): ICD-10-CM

## 2018-07-02 LAB
ALBUMIN SERPL-MCNC: 3.4 G/DL (ref 3.4–5)
ALP SERPL-CCNC: 87 U/L (ref 40–150)
ALT SERPL W P-5'-P-CCNC: 42 U/L (ref 0–50)
AST SERPL W P-5'-P-CCNC: 32 U/L (ref 0–45)
BILIRUB DIRECT SERPL-MCNC: 0.1 MG/DL (ref 0–0.2)
BILIRUB SERPL-MCNC: 0.4 MG/DL (ref 0.2–1.3)
PROT SERPL-MCNC: 7.1 G/DL (ref 6.8–8.8)

## 2018-07-02 PROCEDURE — 80076 HEPATIC FUNCTION PANEL: CPT | Performed by: INTERNAL MEDICINE

## 2018-07-02 PROCEDURE — 36415 COLL VENOUS BLD VENIPUNCTURE: CPT | Performed by: INTERNAL MEDICINE

## 2018-09-28 ENCOUNTER — OFFICE VISIT (OUTPATIENT)
Dept: RHEUMATOLOGY | Facility: CLINIC | Age: 28
End: 2018-09-28
Payer: COMMERCIAL

## 2018-09-28 VITALS
DIASTOLIC BLOOD PRESSURE: 73 MMHG | SYSTOLIC BLOOD PRESSURE: 112 MMHG | WEIGHT: 185.5 LBS | BODY MASS INDEX: 31.67 KG/M2 | HEIGHT: 64 IN | HEART RATE: 100 BPM | OXYGEN SATURATION: 99 %

## 2018-09-28 DIAGNOSIS — M08.00 JUVENILE RHEUMATOID ARTHRITIS (H): Primary | ICD-10-CM

## 2018-09-28 DIAGNOSIS — Z3A.21 21 WEEKS GESTATION OF PREGNANCY: ICD-10-CM

## 2018-09-28 PROCEDURE — 99214 OFFICE O/P EST MOD 30 MIN: CPT | Performed by: INTERNAL MEDICINE

## 2018-09-28 ASSESSMENT — PAIN SCALES - GENERAL: PAINLEVEL: NO PAIN (0)

## 2018-09-28 NOTE — PROGRESS NOTES
Ms. 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 -DELL, -CCP, -RF, -Ro. No erosions.    She is now 21 weeks pregnancy. She feels good and no pregnancy complication. Energy is good now, but she was nauseated during the first trimester.    Her joints are good and have not required any analgesics or NSAIDs. Minor back pain and hip pain. No new dysfunction or deformity. No AM stiffness.    She plans to breast feed post-partum.    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 , no children. No tobacco, social EtOH, no illicit drugs. No exposures to infectious hepatitis or HIV. Right handed.     PMSF history personally obtained and updated by me in this clinic visit.    ROS:  +insomnia with nocturiia  +heartburn with certain foods  +headaches  +allergies to pets and seasonal allergies  Remainder of the 14 point ROS obtained and found negative.    Physical Exam:  Constitutional: WD-WN-WG cooperative  Eyes: nl EOM, PERRLA, vision, conjunctiva, sclera  ENT: nl external ears, nose, hearing, lips, teeth, gums, throat; +modest parotid swelling  Neck: no mass or thyroid enlargement  Resp: lungs clear to auscultation, nl to palpation, nl effort  CV: RRR, no murmurs, rubs or gallops, no edema  GI: no ABD mass or tenderness, no HSM; +gravid  : not tested  Lymph: no cervical or epitrochlear nodes  MS:  All TMJ, neck, shoulder, elbow, wrist, hand, spine, hip, knee, ankle, and foot joints were examined and otherwise found normal. Normal  strength. Full ROM. Normal tuck and  prayer.  Skin: no nail pitting, alopecia, rash  Neuro: nl cranial nerves, strength, sensation, DTRs.   Psych: nl judgement, orientation, memory, affect.    Laboratory:    Component      Latest Ref Rng & Units 5/24/2018 7/2/2018   WBC      4.0 - 11.0 10e9/L 6.3    RBC Count      3.8 - 5.2 10e12/L 4.79    Hemoglobin      11.7 - 15.7 g/dL 14.0    Hematocrit      35.0 - 47.0 % 40.9    MCV      78 - 100 fl 85    MCH      26.5 - 33.0 pg 29.2    MCHC      31.5 - 36.5 g/dL 34.2    RDW      10.0 - 15.0 % 13.2    Platelet Count      150 - 450 10e9/L 243    Bilirubin Direct      0.0 - 0.2 mg/dL  0.1   Bilirubin Total      0.2 - 1.3 mg/dL  0.4   Albumin      3.4 - 5.0 g/dL 3.8 3.4   Protein Total      6.8 - 8.8 g/dL  7.1   Alkaline Phosphatase      40 - 150 U/L  87   ALT      0 - 50 U/L 74 (H) 42   AST      0 - 45 U/L 52 (H) 32   Creatinine      0.52 - 1.04 mg/dL 0.73    GFR Estimate      >60 mL/min/1.7m2 >90    GFR Estimate If Black      >60 mL/min/1.7m2 >90    TSH      0.40 - 4.00 mU/L 3.04    HIV Antigen Antibody Combo      NR:Nonreactive     Nonreactive    Glucose      70 - 99 mg/dL 102 (H)    Hemoglobin A1C      0 - 5.6 % 5.1    FSH      IU/L 1.7    Prolactin      3 - 27 ug/L 21    HCG Qualitative Serum      NEG:Negative Negative    Testosterone Total      8 - 60 ng/dL 35    Ferritin      12 - 150 ng/mL 30      Component      Latest Ref Rng & Units 7/18/2014 12/5/2014   DELL Screen by EIA      <1.0 <1.0 . . .    SSA (Ro) (ART) Antibody, IgG      0.0 - 0.9 AI  <0.2 . . .     Impression:    Pauciarticular Chronic Juvenile Rheumatoid Arthritis-now off of DMARD therapy during pregnancy. She is doing very well and I think it is unlikely that she will have arthritis during pregnancy or severe flare post-partum. I recommend occasional tylenol during pregnancy for joint pain, and we will be vigilant postpartum and consider low dose prednisone and/or hydroxychloroquine when necessary during the first 6 months. Because her arthritis  has been mild over the past year, we would consider reinstitution of hydroxychloroquine during the first 6 months postpartum only if necessary, even though I don't consider this a contraindication to breast feeding.    Pregnancy-21 weeks and uncomplicated.     Eosinophilic esophagitis-stable.    Long term monitoring of immunosuppression-with normal lab testing.    Plan return appointment in 10 weeks and again around March 5th. Consider hand/wrist films in March.

## 2018-09-28 NOTE — NURSING NOTE
"Alka Casey's goals for this visit include:   She requests these members of her care team be copied on today's visit information:     PCP: Rojelio Ivey    Referring Provider:  No referring provider defined for this encounter.    /73  Pulse 100  Ht 1.626 m (5' 4\")  Wt 84.1 kg (185 lb 8 oz)  SpO2 99%  BMI 31.84 kg/m2   "

## 2018-09-28 NOTE — MR AVS SNAPSHOT
After Visit Summary   9/28/2018    Alka Casey    MRN: [id number]           Patient Information     Date Of Birth          1990        Visit Information        Provider Department      9/28/2018 8:00 AM Chris Duff MD [facility]         Follow-ups after your visit        Follow-up notes from your care team     Return in about 10 weeks (around 12/7/2018) for as well as return appointment soon after March 5th..      Your next 10 appointments already scheduled     Dec 28, 2018 11:30 AM CST   [facility] with Chris Duff MD   [facility]    [address] [place] [id number] [phone]            Mar 15, 2019 11:30 AM CDT   Return Visit with Chris Duff MD   [facility]    [address] [place] [address] [id number] [phone]              Who to contact     If you have questions or need follow up information about today's clinic visit or your schedule please contact [facility] directly at [phone].  Normal or non-critical lab and imaging results will be communicated to you by MyChart, letter or phone within 4 business days after the clinic has received the results. If you do not hear from us within 7 days, please contact the clinic through [employer] or phone. If you have a critical or abnormal lab result, we will notify you by phone as soon as possible.  Submit refill requests through [employer] or call your pharmacy and they will forward the refill request to us. Please allow 3 business days for your refill to be completed.          Additional Information About Your Visit        MyChart Information     [employer] gives you secure access to your electronic health record. If you see a primary care provider, you can also send messages to your care team and make appointments. If you have questions, please call your primary care  "clinic.  If you do not have a primary care provider, please call [phone] and they will assist you.      [employer] is an electronic gateway that provides easy, online access to your medical records. With [employer] you can request a clinic appointment, read your test results, renew a prescription or communicate with your care team.     To access your existing account, please contact your [facility] or call [phone] for assistance.        Care EveryWhere ID     This is your Care EveryWhere ID. This could be used by other organizations to access your [facility] medical records  [id number]        Your Vitals Were     Pulse Height Pulse Oximetry BMI (Body Mass Index)          100 1.626 m (5' 4\") 99% 31.84 kg/m2         Blood Pressure from Last 3 Encounters:   09/28/18 112/73   05/24/18 118/69   03/23/18 117/78    Weight from Last 3 Encounters:   09/28/18 84.1 kg (185 lb 8 oz)   05/24/18 81.6 kg (180 lb)   03/23/18 80.7 kg (178 lb)              Today, you had the following     No orders found for display       Primary Care Provider Office Phone # Fax #    Rojelio Ivey [phone]       [address]  [facility] [address]        Equal Access to Services     [facility]: Hadii aad ku hadasho Soomaali, waaxda luqadaha, qaybta kaalmada adeegyada, waxay idiin hayaan juan kharanicole la'corby . So [facility] [phone].    ATENCIÓN: Si habla español, tiene a peñaloza disposición servicios gratuitos de asistencia lingüística. Llame al [phone].    We comply with applicable federal civil rights laws and [place] laws. We do not discriminate on the basis of race, color, national origin, age, disability, sex, sexual orientation, or gender identity.            Thank you!     Thank you for choosing [facility]  for your care. Our goal is always to provide you with excellent care. Hearing back from our patients is one way we can continue to improve our services. " Please take a few minutes to complete the written survey that you may receive in the mail after your visit with us. Thank you!             Your Updated Medication List - Protect others around you: Learn how to safely use, store and throw away your medicines at www.disposemymeds.org.          This list is accurate as of 9/28/18  8:48 AM.  Always use your most recent med list.                   Brand Name Dispense Instructions for use Diagnosis    levalbuterol 45 MCG/ACT Inhaler    XOPENEX HFA    1 Inhaler    Inhale 2 puffs into the lungs every 4 hours as needed for asthma symptoms.    Mild persistent asthma without complication       levocetirizine 5 MG tablet    XYZAL    90 tablet    Take 1 tablet (5 mg) by mouth daily for allergy prevention.    Perennial allergic rhinitis with seasonal variation       montelukast 10 MG tablet    SINGULAIR    90 tablet    Take 1 tablet (10 mg) by mouth daily for asthma prevention.    Mild persistent asthma without complication       * PRENATAL VITAMIN PO      Take 2 chew tab by mouth daily        * prenatal multivitamin  plus iron 27-1 MG Tabs     90 tablet    Take 1 tablet by mouth daily while pregnant or trying to become pregnant.    Pre-conception counseling       sertraline 100 MG tablet    ZOLOFT    90 tablet    Take 1 tablet (100 mg) by mouth daily for depression.    Major depressive disorder, recurrent episode, mild (H)       * Notice:  This list has 2 medication(s) that are the same as other medications prescribed for you. Read the directions carefully, and ask your doctor or other care provider to review them with you.

## 2018-10-22 ENCOUNTER — TRANSFERRED RECORDS (OUTPATIENT)
Dept: HEALTH INFORMATION MANAGEMENT | Facility: CLINIC | Age: 28
End: 2018-10-22

## 2018-11-27 DIAGNOSIS — F33.0 MAJOR DEPRESSIVE DISORDER, RECURRENT EPISODE, MILD (H): ICD-10-CM

## 2018-11-27 NOTE — TELEPHONE ENCOUNTER
"Requested Prescriptions   Pending Prescriptions Disp Refills     sertraline (ZOLOFT) 100 MG tablet [Pharmacy Med Name: Sertraline HCl Oral Tablet 100 MG]  Last Written Prescription Date:  05/24/18  Last Fill Quantity: 90,  # refills: 1   Last Office Visit with KYAW, DENY or [facility] prescribing provider:  05/24/18Radha   Future Office Visit:    Next 5 appointments (look out 90 days)     Dec 28, 2018 11:30 AM CST   [facility] Visit with Chris Duff MD   [facility]    [address] [place] [id number] [phone]                90 tablet 0     Sig: Take 1 tablet (100 mg) by mouth daily for depression.    SSRIs Protocol Failed    11/27/2018  7:06 AM       Failed - PHQ-9 score less than 5 in past 6 months    Please review last PHQ-9 score.          Failed - Recent (6 mo) or future (30 days) visit within the authorizing provider's specialty    Patient had office visit in the last 6 months or has a visit in the next 30 days with authorizing provider or within the authorizing provider's specialty.  See \"Patient Info\" tab in inbasket, or \"Choose Columns\" in Meds & Orders section of the refill encounter.           Passed - Patient is age 18 or older       Passed - No active pregnancy on record       Passed - No positive pregnancy test in last 12 months          "

## 2018-11-28 NOTE — TELEPHONE ENCOUNTER
Routing refill request to provider for review/approval because:  Patient needs to be seen because:  Over due for 6 month recheck        Chanel Hanson RN, BSN

## 2018-11-29 RX ORDER — SERTRALINE HYDROCHLORIDE 100 MG/1
TABLET, FILM COATED ORAL
Qty: 30 TABLET | Refills: 0 | Status: SHIPPED | OUTPATIENT
Start: 2018-11-29 | End: 2021-05-25

## 2018-11-29 NOTE — TELEPHONE ENCOUNTER
Let patient know she needs to be seen for depression & asthma check within 1 month for further refills.

## 2018-12-21 DIAGNOSIS — J30.2 PERENNIAL ALLERGIC RHINITIS WITH SEASONAL VARIATION: ICD-10-CM

## 2018-12-21 DIAGNOSIS — J30.89 PERENNIAL ALLERGIC RHINITIS WITH SEASONAL VARIATION: ICD-10-CM

## 2018-12-21 NOTE — TELEPHONE ENCOUNTER
"Requested Prescriptions   Pending Prescriptions Disp Refills     levocetirizine (XYZAL) 5 MG tablet [Pharmacy Med Name: Levocetirizine Dihydrochloride Oral Tablet 5 MG]  Last Written Prescription Date:  11/02/17  Last Fill Quantity: 1,  # refills: 3   Last Office Visit with KYAW, DENY or [facility] prescribing provider:  05/24/18Radha   Future Office Visit:    Next 5 appointments (look out 90 days)    Dec 28, 2018 11:30 AM CST  Return Visit with Chris Duff MD  [facility]) [address] [place] [id number]  [phone]   Mar 15, 2019 11:30 AM CDT  Return Visit with Chris Duff MD  [facility]) [address] [place] [phone]  [phone]        90 tablet 2     Sig: Take 1 tablet (5 mg) by mouth daily for allergy prevention    Antihistamines Protocol Passed - 12/21/2018 10:55 AM       Passed - Patient is 3-64 years of age    Apply weight-based dosing for peds patients age 3 - 12 years of age.    Forward request to provider for patients under the age of 3 or over the age of 64.         Passed - Recent (12 mo) or future (30 days) visit within the authorizing provider's specialty    Patient had office visit in the last 12 months or has a visit in the next 30 days with authorizing provider or within the authorizing provider's specialty.  See \"Patient Info\" tab in inbasket, or \"Choose Columns\" in Meds & Orders section of the refill encounter.                "

## 2018-12-24 RX ORDER — LEVOCETIRIZINE DIHYDROCHLORIDE 5 MG/1
5 TABLET, FILM COATED ORAL DAILY
Qty: 90 TABLET | Refills: 1 | Status: SHIPPED | OUTPATIENT
Start: 2018-12-24 | End: 2019-06-25

## 2018-12-24 NOTE — TELEPHONE ENCOUNTER
Prescription approved per [facility] Refill Protocol.    Viri Baca RN, [facility]

## 2019-03-15 ENCOUNTER — OFFICE VISIT (OUTPATIENT)
Dept: RHEUMATOLOGY | Facility: CLINIC | Age: 29
End: 2019-03-15
Payer: COMMERCIAL

## 2019-03-15 VITALS
DIASTOLIC BLOOD PRESSURE: 75 MMHG | SYSTOLIC BLOOD PRESSURE: 112 MMHG | HEIGHT: 64 IN | OXYGEN SATURATION: 98 % | BODY MASS INDEX: 30.77 KG/M2 | RESPIRATION RATE: 17 BRPM | WEIGHT: 180.2 LBS | HEART RATE: 83 BPM

## 2019-03-15 DIAGNOSIS — Z79.899 HX OF LONG-TERM TREATMENT WITH HIGH-RISK MEDICATION: ICD-10-CM

## 2019-03-15 DIAGNOSIS — M08.00 JUVENILE RHEUMATOID ARTHRITIS (H): Primary | ICD-10-CM

## 2019-03-15 LAB
ALBUMIN SERPL-MCNC: 3.9 G/DL (ref 3.4–5)
ALBUMIN UR-MCNC: NEGATIVE MG/DL
ALP SERPL-CCNC: 172 U/L (ref 40–150)
ALT SERPL W P-5'-P-CCNC: 45 U/L (ref 0–50)
ANION GAP SERPL CALCULATED.3IONS-SCNC: 7 MMOL/L (ref 3–14)
APPEARANCE UR: ABNORMAL
AST SERPL W P-5'-P-CCNC: 29 U/L (ref 0–45)
BACTERIA #/AREA URNS HPF: ABNORMAL /HPF
BASOPHILS # BLD AUTO: 0.1 10E9/L (ref 0–0.2)
BASOPHILS NFR BLD AUTO: 0.8 %
BILIRUB SERPL-MCNC: 0.5 MG/DL (ref 0.2–1.3)
BILIRUB UR QL STRIP: NEGATIVE
BUN SERPL-MCNC: 14 MG/DL (ref 7–30)
CALCIUM SERPL-MCNC: 8.6 MG/DL (ref 8.5–10.1)
CHLORIDE SERPL-SCNC: 106 MMOL/L (ref 94–109)
CO2 SERPL-SCNC: 28 MMOL/L (ref 20–32)
COLOR UR AUTO: ABNORMAL
CREAT SERPL-MCNC: 0.87 MG/DL (ref 0.52–1.04)
DIFFERENTIAL METHOD BLD: ABNORMAL
EOSINOPHIL # BLD AUTO: 0.4 10E9/L (ref 0–0.7)
EOSINOPHIL NFR BLD AUTO: 5.1 %
ERYTHROCYTE [DISTWIDTH] IN BLOOD BY AUTOMATED COUNT: 15.8 % (ref 10–15)
GFR SERPL CREATININE-BSD FRML MDRD: >90 ML/MIN/{1.73_M2}
GLUCOSE SERPL-MCNC: 79 MG/DL (ref 70–99)
GLUCOSE UR STRIP-MCNC: NEGATIVE MG/DL
HCT VFR BLD AUTO: 37 % (ref 35–47)
HGB BLD-MCNC: 11.5 G/DL (ref 11.7–15.7)
HGB UR QL STRIP: ABNORMAL
IMM GRANULOCYTES # BLD: 0 10E9/L (ref 0–0.4)
IMM GRANULOCYTES NFR BLD: 0.3 %
KETONES UR STRIP-MCNC: NEGATIVE MG/DL
LEUKOCYTE ESTERASE UR QL STRIP: ABNORMAL
LYMPHOCYTES # BLD AUTO: 1.9 10E9/L (ref 0.8–5.3)
LYMPHOCYTES NFR BLD AUTO: 26 %
MCH RBC QN AUTO: 24.9 PG (ref 26.5–33)
MCHC RBC AUTO-ENTMCNC: 31.1 G/DL (ref 31.5–36.5)
MCV RBC AUTO: 80 FL (ref 78–100)
MONOCYTES # BLD AUTO: 0.4 10E9/L (ref 0–1.3)
MONOCYTES NFR BLD AUTO: 5.8 %
NEUTROPHILS # BLD AUTO: 4.6 10E9/L (ref 1.6–8.3)
NEUTROPHILS NFR BLD AUTO: 62 %
NITRATE UR QL: NEGATIVE
NON-SQ EPI CELLS #/AREA URNS LPF: ABNORMAL /LPF
PH UR STRIP: 5.5 PH (ref 5–7)
PLATELET # BLD AUTO: 308 10E9/L (ref 150–450)
POTASSIUM SERPL-SCNC: 4.3 MMOL/L (ref 3.4–5.3)
PROT SERPL-MCNC: 7.6 G/DL (ref 6.8–8.8)
RBC # BLD AUTO: 4.62 10E12/L (ref 3.8–5.2)
RBC #/AREA URNS AUTO: ABNORMAL /HPF
SODIUM SERPL-SCNC: 141 MMOL/L (ref 133–144)
SOURCE: ABNORMAL
SP GR UR STRIP: 1 (ref 1–1.03)
UROBILINOGEN UR STRIP-MCNC: NORMAL MG/DL (ref 0–2)
WBC # BLD AUTO: 7.4 10E9/L (ref 4–11)
WBC #/AREA URNS AUTO: ABNORMAL /HPF
WBC CLUMPS #/AREA URNS HPF: PRESENT /HPF

## 2019-03-15 PROCEDURE — 36415 COLL VENOUS BLD VENIPUNCTURE: CPT | Performed by: INTERNAL MEDICINE

## 2019-03-15 PROCEDURE — 99213 OFFICE O/P EST LOW 20 MIN: CPT | Performed by: INTERNAL MEDICINE

## 2019-03-15 PROCEDURE — 85025 COMPLETE CBC W/AUTO DIFF WBC: CPT | Performed by: INTERNAL MEDICINE

## 2019-03-15 PROCEDURE — 80053 COMPREHEN METABOLIC PANEL: CPT | Performed by: INTERNAL MEDICINE

## 2019-03-15 PROCEDURE — 81001 URINALYSIS AUTO W/SCOPE: CPT | Performed by: INTERNAL MEDICINE

## 2019-03-15 ASSESSMENT — MIFFLIN-ST. JEOR: SCORE: 1537.38

## 2019-03-15 ASSESSMENT — PAIN SCALES - GENERAL: PAINLEVEL: MILD PAIN (2)

## 2019-03-15 NOTE — NURSING NOTE
"Alka Casey's goals for this visit include: Return  She requests these members of her care team be copied on today's visit information: PCP    PCP: Rojelio Ivey    Referring Provider:  No referring provider defined for this encounter.    /75   Pulse 83   Resp 17   Ht 1.626 m (5' 4\")   Wt 81.7 kg (180 lb 3.2 oz)   SpO2 98%   BMI 30.93 kg/m      Do you need any medication refills at today's visit? N    "

## 2019-03-15 NOTE — PROGRESS NOTES
Ms. 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 -DELL, -CCP, -RF, -Ro. No erosions.    She returns after an uncomplicated pregnancy and delivery of healthy baby boy. She is not breast feeding, and only a brief period of breast pumping. He is now on formula.    Her arthritis was quite quiet during pregnancy. She noted some finger swelling and paresthesia late in pregnancy and she was wearing a wrist brace at night with benefit. This has now resolved. She has some increased neck and back pain with carrying the baby. The wrists can be a little more sore. She notes some swelling of joints.  Generally she did very well during pregnancy.     She is sleep deprived and this can affect her energy, but she is improving.     She is currently using ibuprofen 400 mg plus acetaminophen 500 mg BID for incisional discomfort.     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 , no children. No tobacco, social EtOH, no illicit drugs. No exposures to infectious hepatitis or HIV. Right handed.     PMSF history personally obtained and updated by me in this clinic visit.    ROS:  +depression that is coming under control  +allergies to pets and seasonal allergies  Remainder of the 14 point ROS obtained and found negative.    Physical Exam:  Constitutional: WD-WN-WG cooperative  Eyes: nl EOM, PERRLA, vision, conjunctiva, sclera  ENT: nl external ears, nose, hearing, lips, teeth, gums, throat; +modest parotid swelling  Neck: no mass or thyroid enlargement  Resp: lungs clear  to auscultation, nl to palpation, nl effort  CV: RRR, no murmurs, rubs or gallops, no edema  GI: no ABD mass or tenderness, no HSM, soft  : not tested  Lymph: no cervical or epitrochlear nodes  MS:  All TMJ, neck, shoulder, elbow, wrist, hand, spine, hip, knee, ankle, and foot joints were examined and otherwise found normal. Normal  strength. Full ROM. Normal tuck and prayer.  Skin: no nail pitting, alopecia, rash  Neuro: nl cranial nerves, strength, sensation, DTRs.   Psych: nl judgement, orientation, memory, affect.    Laboratory:    Pending    Impression:    Pauciarticular Chronic Juvenile Rheumatoid Arthritis-off of DMARD therapy post-partum. She is quiet postpartum, and desires a holiday from hydroxychloroquine. I think this is reasonable and she would call should flaring occur. She will continue the PRN ibuprofen and tylenol. I will get laboratory testing today to evaluate for toxicity.    Eosinophilic esophagitis-stable. I have cautioned her about use of ibuprofen and she would call if heartburn became a problem.    RTC in 6 months.

## 2019-09-20 ENCOUNTER — ANCILLARY PROCEDURE (OUTPATIENT)
Dept: GENERAL RADIOLOGY | Facility: CLINIC | Age: 29
End: 2019-09-20
Attending: INTERNAL MEDICINE
Payer: COMMERCIAL

## 2019-09-20 ENCOUNTER — OFFICE VISIT (OUTPATIENT)
Dept: RHEUMATOLOGY | Facility: CLINIC | Age: 29
End: 2019-09-20
Payer: COMMERCIAL

## 2019-09-20 VITALS
BODY MASS INDEX: 32.71 KG/M2 | WEIGHT: 191.6 LBS | OXYGEN SATURATION: 98 % | RESPIRATION RATE: 18 BRPM | SYSTOLIC BLOOD PRESSURE: 115 MMHG | HEART RATE: 88 BPM | HEIGHT: 64 IN | DIASTOLIC BLOOD PRESSURE: 74 MMHG

## 2019-09-20 DIAGNOSIS — M08.00 JUVENILE RHEUMATOID ARTHRITIS (H): Primary | ICD-10-CM

## 2019-09-20 DIAGNOSIS — Z79.60 LONG-TERM USE OF IMMUNOSUPPRESSANT MEDICATION: ICD-10-CM

## 2019-09-20 DIAGNOSIS — M08.00 JUVENILE RHEUMATOID ARTHRITIS (H): ICD-10-CM

## 2019-09-20 DIAGNOSIS — E55.9 VITAMIN D INSUFFICIENCY: ICD-10-CM

## 2019-09-20 DIAGNOSIS — K20.0 EOSINOPHILIC ESOPHAGITIS: ICD-10-CM

## 2019-09-20 DIAGNOSIS — R82.81 PYURIA: ICD-10-CM

## 2019-09-20 LAB
ALBUMIN SERPL-MCNC: 3.6 G/DL (ref 3.4–5)
ALT SERPL W P-5'-P-CCNC: 29 U/L (ref 0–50)
AST SERPL W P-5'-P-CCNC: 30 U/L (ref 0–45)
CREAT SERPL-MCNC: 0.72 MG/DL (ref 0.52–1.04)
ERYTHROCYTE [DISTWIDTH] IN BLOOD BY AUTOMATED COUNT: 15.9 % (ref 10–15)
GFR SERPL CREATININE-BSD FRML MDRD: >90 ML/MIN/{1.73_M2}
HCT VFR BLD AUTO: 39.9 % (ref 35–47)
HGB BLD-MCNC: 13 G/DL (ref 11.7–15.7)
MCH RBC QN AUTO: 25.9 PG (ref 26.5–33)
MCHC RBC AUTO-ENTMCNC: 32.6 G/DL (ref 31.5–36.5)
MCV RBC AUTO: 80 FL (ref 78–100)
PLATELET # BLD AUTO: 292 10E9/L (ref 150–450)
RBC # BLD AUTO: 5.01 10E12/L (ref 3.8–5.2)
WBC # BLD AUTO: 6.3 10E9/L (ref 4–11)

## 2019-09-20 PROCEDURE — 82565 ASSAY OF CREATININE: CPT | Performed by: INTERNAL MEDICINE

## 2019-09-20 PROCEDURE — 84460 ALANINE AMINO (ALT) (SGPT): CPT | Performed by: INTERNAL MEDICINE

## 2019-09-20 PROCEDURE — 36415 COLL VENOUS BLD VENIPUNCTURE: CPT | Performed by: INTERNAL MEDICINE

## 2019-09-20 PROCEDURE — 73130 X-RAY EXAM OF HAND: CPT | Mod: LT | Performed by: RADIOLOGY

## 2019-09-20 PROCEDURE — 84450 TRANSFERASE (AST) (SGOT): CPT | Performed by: INTERNAL MEDICINE

## 2019-09-20 PROCEDURE — 99215 OFFICE O/P EST HI 40 MIN: CPT | Performed by: INTERNAL MEDICINE

## 2019-09-20 PROCEDURE — 73110 X-RAY EXAM OF WRIST: CPT | Mod: LT | Performed by: RADIOLOGY

## 2019-09-20 PROCEDURE — 85027 COMPLETE CBC AUTOMATED: CPT | Performed by: INTERNAL MEDICINE

## 2019-09-20 PROCEDURE — 82040 ASSAY OF SERUM ALBUMIN: CPT | Performed by: INTERNAL MEDICINE

## 2019-09-20 RX ORDER — HYDROXYCHLOROQUINE SULFATE 200 MG/1
400 TABLET, FILM COATED ORAL DAILY
Qty: 180 TABLET | Refills: 3 | Status: SHIPPED | OUTPATIENT
Start: 2019-09-20 | End: 2020-03-27

## 2019-09-20 RX ORDER — DESOGESTREL AND ETHINYL ESTRADIOL 0.15-0.03
KIT ORAL
COMMUNITY
End: 2020-09-25

## 2019-09-20 ASSESSMENT — MIFFLIN-ST. JEOR: SCORE: 1584.09

## 2019-09-20 ASSESSMENT — ROUTINE ASSESSMENT OF PATIENT INDEX DATA (RAPID3)
RAPID3 INTERPRETATION: MODERATE 6.1-12.0
TOTAL RAPID3 SCORE: 9.5

## 2019-09-20 ASSESSMENT — PAIN SCALES - GENERAL: PAINLEVEL: MILD PAIN (3)

## 2019-09-20 ASSESSMENT — PATIENT HEALTH QUESTIONNAIRE - PHQ9: SUM OF ALL RESPONSES TO PHQ QUESTIONS 1-9: 7

## 2019-09-20 NOTE — PROGRESS NOTES
Ms. 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 -DELL, -CCP, -RF, -Ro. No erosions.    She has interrupted sleep with her infant and has lower energy. No AM stiffness to report. She notices increased joint pain and swelling off of medication. She feels her arthritis is returning post delivery. No joint dysfunction or deformity.    She is concerned about going back on hydroxychloroquine medication. Her eye doctor has expressed general concern for her but no specific findings by exam. She previously has tolerated this well and received good benefit. She is no longer breast feeding.    No use of analgesics or NSAID.    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 No tobacco, social EtOH, no illicit drugs. No exposures to infectious hepatitis or HIV. Right handed.     PMSF history personally obtained and updated by me in this clinic visit.    ROS:  +occasional heartburn with eosinophil esophagitis  +allergies to pets and seasonal allergies  Remainder of the 14 point ROS obtained and found negative.    Physical Exam:  Constitutional: WD-WN-WG cooperative  Eyes: nl EOM, PERRLA, vision, conjunctiva, sclera  ENT: nl external ears, nose, hearing, lips, teeth, gums, throat; +modest parotid swelling  Neck: no mass or thyroid enlargement  Resp: lungs clear to auscultation, nl to palpation, nl effort  CV: RRR, no murmurs, rubs or gallops, no edema  GI: no ABD mass or tenderness, no HSM, soft  : not tested  Lymph: no cervical or  epitrochlear nodes  MS:  +left wrist laxity; All other TMJ, neck, shoulder, elbow, wrist, hand, spine, hip, knee, ankle, and foot joints were examined and otherwise found normal. Normal  strength. Full ROM. Normal tuck and prayer.  Skin: no nail pitting, alopecia, rash  Neuro: nl cranial nerves, strength, sensation, DTRs.   Psych: nl judgement, orientation, memory, affect.    Laboratory:    Component      Latest Ref Rng & Units 9/20/2019   WBC      4.0 - 11.0 10e9/L 6.3   RBC Count      3.8 - 5.2 10e12/L 5.01   Hemoglobin      11.7 - 15.7 g/dL 13.0   Hematocrit      35.0 - 47.0 % 39.9   MCV      78 - 100 fl 80   MCH      26.5 - 33.0 pg 25.9 (L)   MCHC      31.5 - 36.5 g/dL 32.6   RDW      10.0 - 15.0 % 15.9 (H)   Platelet Count      150 - 450 10e9/L 292   Creatinine      0.52 - 1.04 mg/dL 0.72   GFR Estimate      >60 mL/min/1.73:m2 >90   GFR Estimate If Black      >60 mL/min/1.73:m2 >90   Albumin      3.4 - 5.0 g/dL 3.6   ALT      0 - 50 U/L 29   AST      0 - 45 U/L 30       Impression:    Pauciarticular Chronic Juvenile Rheumatoid Arthritis-off of DMARD therapy post-partum. She is now having increased fatigue and joint pains/swelling that relate to minor arthritis flaring. Based on this, we have discussed the risks and benefits of hydroxychloroquine 400 mg daily and she agrees to its use.    Eosinophilic esophagitis-this is modestly worse, but she doesn't feel that medications have been helpful. She plans repeat surveillance upper endoscopy.    Long term management of immunosuppression-with stable lab baseline today. Plan repeat eye check.    RTC in 6 months.

## 2019-09-20 NOTE — NURSING NOTE
"Alka Casey's goals for this visit include: Return  She requests these members of her care team be copied on today's visit information: PCP    PCP: Rojelio Ivye    Referring Provider:  Rojelio Ivey MD  [address] [address]    /74   Pulse 88   Resp 18   Ht 1.626 m (5' 4\")   Wt 86.9 kg (191 lb 9.6 oz)   SpO2 98%   BMI 32.89 kg/m      Do you need any medication refills at today's visit? N    "

## 2019-09-20 NOTE — LETTER
2019        RE: Alka Casey  [address] [facility] [address]        Ms. 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 -DELL, -CCP, -RF, -Ro. No erosions.    She has interrupted sleep with her infant and has lower energy. No AM stiffness to report. She notices increased joint pain and swelling off of medication. She feels her arthritis is returning post delivery. No joint dysfunction or deformity.    She is concerned about going back on hydroxychloroquine medication. Her eye doctor has expressed general concern for her but no specific findings by exam. She previously has tolerated this well and received good benefit. She is no longer breast feeding.    No use of analgesics or NSAID.    PMI:  Medical-asthma, juvenile arthritis, low back facet lumbar syndrome, chronic depression, eosinophilic esophagitis, vitamin D insufficiency  Surgical-wisdom tooth extractions,   Injuries-toe fractures    FH:  GM with CHF  GM with colon cancer  Aunts, uncles with RA  Mother with OA, dysrhythmia, depression, and fibromyalgia  Father with dysrhythmia, allergies and foot  Sister is chronic anxiety  Son is healthy    SH:  Works as an , training for [employer]. , one son. No tobacco, social EtOH, no illicit drugs. No exposures to infectious hepatitis or HIV. Right handed.     PMSF history personally obtained and updated by me in this clinic visit.    ROS:  +occasional heartburn with eosinophil esophagitis  +allergies to pets and seasonal allergies  Remainder of the 14 point ROS obtained and found negative.    Physical Exam:  Constitutional: WD-WN-WG cooperative  Eyes: nl EOM, PERRLA, vision, conjunctiva, sclera  ENT: nl external ears, nose, hearing, lips, teeth, gums, throat; +modest parotid swelling  Neck: no mass or thyroid enlargement  Resp: lungs clear to auscultation, nl to palpation, nl effort  CV: RRR, no murmurs, rubs or gallops, no edema  GI:  no ABD mass or tenderness, no HSM, soft  : not tested  Lymph: no cervical or epitrochlear nodes  MS:  +left wrist laxity; All other TMJ, neck, shoulder, elbow, wrist, hand, spine, hip, knee, ankle, and foot joints were examined and otherwise found normal. Normal  strength. Full ROM. Normal tuck and prayer.  Skin: no nail pitting, alopecia, rash  Neuro: nl cranial nerves, strength, sensation, DTRs.   Psych: nl judgement, orientation, memory, affect.    Laboratory:    Component      Latest Ref Rng & Units 9/20/2019   WBC      4.0 - 11.0 10e9/L 6.3   RBC Count      3.8 - 5.2 10e12/L 5.01   Hemoglobin      11.7 - 15.7 g/dL 13.0   Hematocrit      35.0 - 47.0 % 39.9   MCV      78 - 100 fl 80   MCH      26.5 - 33.0 pg 25.9 (L)   MCHC      31.5 - 36.5 g/dL 32.6   RDW      10.0 - 15.0 % 15.9 (H)   Platelet Count      150 - 450 10e9/L 292   Creatinine      0.52 - 1.04 mg/dL 0.72   GFR Estimate      >60 mL/min/1.73:m2 >90   GFR Estimate If Black      >60 mL/min/1.73:m2 >90   Albumin      3.4 - 5.0 g/dL 3.6   ALT      0 - 50 U/L 29   AST      0 - 45 U/L 30       Impression:    Pauciarticular Chronic Juvenile Rheumatoid Arthritis-off of DMARD therapy post-partum. She is now having increased fatigue and joint pains/swelling that relate to minor arthritis flaring. Based on this, we have discussed the risks and benefits of hydroxychloroquine 400 mg daily and she agrees to its use.    Eosinophilic esophagitis-this is modestly worse, but she doesn't feel that medications have been helpful. She plans repeat surveillance upper endoscopy.    Long term management of immunosuppression-with stable lab baseline today. Plan repeat eye check.    RTC in 6 months.            Sincerely,        Chris Duff MD

## 2019-09-20 NOTE — PATIENT INSTRUCTIONS
If you have had any blood work, imaging or other testing completed we will be in touch within 1-2 weeks regarding the results.     If you need refills please contact your pharmacy.     It was a pleasure meeting with you today. Please let us know if there is anything else we can do for you so that we can be sure you are leaving completely satisfied with your care experience.     If you have any questions, concerns or need to schedule a follow up, please contact us at [phone] and our fax [phone].    Your Rheumatology Team at [facility]

## 2019-09-27 ENCOUNTER — HEALTH MAINTENANCE LETTER (OUTPATIENT)
Age: 29
End: 2019-09-27

## 2019-12-06 ENCOUNTER — ANCILLARY PROCEDURE (OUTPATIENT)
Dept: GENERAL RADIOLOGY | Facility: CLINIC | Age: 29
End: 2019-12-06
Attending: FAMILY MEDICINE
Payer: COMMERCIAL

## 2019-12-06 ENCOUNTER — OFFICE VISIT (OUTPATIENT)
Dept: ORTHOPEDICS | Facility: CLINIC | Age: 29
End: 2019-12-06
Attending: INTERNAL MEDICINE
Payer: COMMERCIAL

## 2019-12-06 VITALS
HEIGHT: 64 IN | BODY MASS INDEX: 32.95 KG/M2 | SYSTOLIC BLOOD PRESSURE: 117 MMHG | OXYGEN SATURATION: 97 % | DIASTOLIC BLOOD PRESSURE: 71 MMHG | HEART RATE: 87 BPM | WEIGHT: 193 LBS

## 2019-12-06 DIAGNOSIS — M25.572 CHRONIC PAIN OF LEFT ANKLE: ICD-10-CM

## 2019-12-06 DIAGNOSIS — G89.29 CHRONIC PAIN OF LEFT ANKLE: Primary | ICD-10-CM

## 2019-12-06 DIAGNOSIS — G89.29 CHRONIC PAIN OF LEFT ANKLE: ICD-10-CM

## 2019-12-06 DIAGNOSIS — M25.572 CHRONIC PAIN OF LEFT ANKLE: Primary | ICD-10-CM

## 2019-12-06 PROCEDURE — 73610 X-RAY EXAM OF ANKLE: CPT | Mod: LT | Performed by: RADIOLOGY

## 2019-12-06 PROCEDURE — 99213 OFFICE O/P EST LOW 20 MIN: CPT | Performed by: FAMILY MEDICINE

## 2019-12-06 RX ORDER — NORGESTREL-ETHINYL ESTRADIOL 0.3-0.03MG
TABLET ORAL
Refills: 0 | COMMUNITY
Start: 2019-11-09 | End: 2020-09-25

## 2019-12-06 ASSESSMENT — PAIN SCALES - GENERAL: PAINLEVEL: MILD PAIN (3)

## 2019-12-06 ASSESSMENT — MIFFLIN-ST. JEOR: SCORE: 1586.47

## 2019-12-06 NOTE — PROGRESS NOTES
"CHIEF COMPLAINT:  Pain of the Left Ankle (Juvenile rheumatoid arthritis)       HISTORY OF PRESENT ILLNESS  Ms. Casey is a pleasant 29 year old year old female history of juvenile rheumatoid arthritis pauciarticular juvenile chronic/idiopathic arthritis currently on plaquenil who presents to clinic today for evaluation of left ankle.  Alka explains that left ankle has been bothersome over the past several years, but believes last six months have been uncomfortable.  On December 2 she was walking and \"rolled it\", exacerbating symptoms.  Not believed to be related to her rheumatologic condition.       What part of your body is injured / painful?  left ankle lateral     What caused the injury /pain? Juvenile rheumatoid arthritis, multiple sprains    How long ago did your injury occur or pain begin? several years ago    What are your most bothersome symptoms? Pain, Swelling, Weakness, Clicking, popping and Giving way or instability    How would you characterize your symptom?  aching and stabbing    What makes your symptoms better? Ice and Wrap or brace and elevation    What makes your symptoms worse? Standing and Walking    Have you been previously seen for this problem? Yes - rheumatology    Additional history: as documented    MEDICAL HISTORY  Patient Active Problem List   Diagnosis     CARDIOVASCULAR SCREENING; LDL GOAL LESS THAN 160     Major depressive disorder, recurrent episode (H)     Seasonal allergic rhinitis     Allergic rhinitis due to dust     Allergic rhinitis due to mold     Allergic rhinitis due to animal dander     Seasonal allergic conjunctivitis     Eosinophilic esophagitis     Mild persistent asthma     Need for desensitization to allergens     Lumbago     Juvenile rheumatoid arthritis     Long-term use of immunosuppressant medication     Obesity, Class I, BMI 30-34.9     Vitamin D insufficiency     Acne vulgaris       Current Outpatient Medications   Medication Sig Dispense Refill     CRYSELLE-Zenon " 0.3-30 MG-MCG tablet   0     hydroxychloroquine (PLAQUENIL) 200 MG tablet Take 2 tablets (400 mg) by mouth daily Annual Plaquenil toxicity eye screening required. 180 tablet 3     levalbuterol (XOPENEX HFA) 45 MCG/ACT Inhaler Inhale 2 puffs into the lungs every 4 hours as needed for asthma symptoms. 1 Inhaler 3     levocetirizine (XYZAL) 5 MG tablet Take 1 tablet (5 mg) by mouth daily for allergy prevention 90 tablet 1     montelukast (SINGULAIR) 10 MG tablet Take 1 tablet (10 mg) by mouth daily for asthma prevention. 90 tablet 3     sertraline (ZOLOFT) 100 MG tablet Take 1 tablet (100 mg) by mouth daily for depression. (Patient taking differently: Take 1 tablet (150mg) by mouth daily for depression.) 30 tablet 0     desogestrel-ethinyl estradiol (DESOGEN) 0.15-30 MG-MCG tablet          No Known Allergies    Family History   Problem Relation Age of Onset     Osteoarthritis Mother      Hypertension Mother      Hyperlipidemia Father      Cataracts Maternal Grandmother      Cataracts Paternal Grandmother      Osteoarthritis Paternal Grandmother      Heart Failure Paternal Grandmother      Breast Cancer Paternal Grandmother      Cancer Paternal Grandmother      Rheumatoid Arthritis Paternal Aunt      Glaucoma Paternal Aunt      Cerebrovascular Disease Paternal Grandfather      Arrhythmia Paternal Grandfather      Cancer Paternal Grandfather      Diabetes Paternal Grandfather      Myocardial Infarction Maternal Grandfather      Colon Cancer Maternal Grandfather      Thyroid Disease Other      Anesthesia Reaction No family hx of      Bleeding Disorder No family hx of      Thrombophilia No family hx of      Macular Degeneration No family hx of        Additional medical/Social/Surgical histories reviewed in [facility] and updated as appropriate.     REVIEW OF SYSTEMS (12/6/2019)  A 10-point review of systems was obtained and is negative except for as noted in the HPI.      PHYSICAL EXAM  There were no vitals taken for this  visit.    General  - normal appearance, in no obvious distress  CV  - normal pulses at posterior tib and dorsalis pedis  Pulm  - normal respiratory pattern, non-labored  Musculoskeletal - left ankle  - stance: Normal gait and stance, no leg length discrepancy.  - inspection: No swelling laterally, normal bone and joint alignment, no obvious deformity  - palpation: Minimalo te tenderness at peroneal tendons laterally inferior to lateral malleolus, nnderness over lateral or medial malleoli, navicular, or base of 5th MT  - ROM: intact globally  - strength: 4/5 in eversion, 5/5 in all other planes  - special tests:  (-) anterior drawer  (-) talar tilt  (-) Tinel's  (-) squeeze test  (-) Mayorga test  Neuro  - no sensory or motor deficit, grossly normal coordination, normal muscle tone  Skin  -no ecchymosis no warmth or induration, no obvious rash  Psych  - interactive, appropriate, normal mood and affect    IMAGING : X-ray left ankle 3 views. Final results and radiologist's interpretation, available in the [facility] health record. Images were reviewed with the patient/family members in the office today. My personal interpretation of the performed imaging is no acute osseous abnormality or substantial degenerative changes.     ASSESSMENT & PLAN  Ms. Casey is a 29 year old year old female who presents to clinic today with acute on chronic left ankle pain.  Although she does have JRA, I do suspect chronic ankle instability is more likely culprit.  Treatment options discussed and will begin with conservative measures at this time.    Diagnosis: Chronic pain of left ankle    -HEP provided for ankle proprioception and strengthening  -Trilock ankle brace provided  -Ice, analgesics PRN  -Follow up 6 weeks if no improvement, consider formal PT    It was a pleasure seeing Alka today.    Lalo Merritt DO, CAQSM  Primary Care Sports Medicine

## 2019-12-06 NOTE — LETTER
"    12/6/2019         RE: Alka Casey  [address] [facility] [address]        Dear Colleague,    Thank you for referring your patient, Alka Casey, to the [facility]. Please see a copy of my visit note below.    CHIEF COMPLAINT:  Pain of the Left Ankle (Juvenile rheumatoid arthritis)       HISTORY OF PRESENT ILLNESS  Ms. Casey is a pleasant 29 year old year old female history of juvenile rheumatoid arthritis pauciarticular juvenile chronic/idiopathic arthritis currently on plaquenil who presents to clinic today for evaluation of left ankle.  Alka explains that left ankle has been bothersome over the past several years, but believes last six months have been uncomfortable.  On December 2 she was walking and \"rolled it\", exacerbating symptoms.  Not believed to be related to her rheumatologic condition.       What part of your body is injured / painful?  left ankle  lateral     What caused the injury /pain? Juvenile rheumatoid arthritis, multiple sprains    How long ago did your injury occur or pain begin? several years ago    What are your most bothersome symptoms? Pain, Swelling, Weakness, Clicking, popping and Giving way or instability    How would you characterize your symptom?  aching and stabbing    What makes your symptoms better? Ice and Wrap or brace and elevation    What makes your symptoms worse? Standing and Walking    Have you been previously seen for this problem? Yes - rheumatology    Additional history: as documented    MEDICAL HISTORY  Patient Active Problem List   Diagnosis     CARDIOVASCULAR SCREENING; LDL GOAL LESS THAN 160     Major depressive disorder, recurrent episode (H)     Seasonal allergic rhinitis     Allergic rhinitis due to dust     Allergic rhinitis due to mold     Allergic rhinitis due to animal dander     Seasonal allergic conjunctivitis     Eosinophilic esophagitis     Mild persistent asthma     Need for desensitization to allergens     Lumbago     Juvenile " rheumatoid arthritis     Long-term use of immunosuppressant medication     Obesity, Class I, BMI 30-34.9     Vitamin D insufficiency     Acne vulgaris       Current Outpatient Medications   Medication Sig Dispense Refill     CRYSELLE-28 0.3-30 MG-MCG tablet   0     hydroxychloroquine (PLAQUENIL) 200 MG tablet Take 2 tablets (400 mg) by mouth daily Annual Plaquenil toxicity eye screening required. 180 tablet 3     levalbuterol (XOPENEX HFA) 45 MCG/ACT Inhaler Inhale 2 puffs into the lungs every 4 hours as needed for asthma symptoms. 1 Inhaler 3     levocetirizine (XYZAL) 5 MG tablet Take 1 tablet (5 mg) by mouth daily for allergy prevention 90 tablet 1     montelukast (SINGULAIR) 10 MG tablet Take 1 tablet (10 mg) by mouth daily for asthma prevention. 90 tablet 3     sertraline (ZOLOFT) 100 MG tablet Take 1 tablet (100 mg) by mouth daily for depression. (Patient taking differently: Take 1 tablet (150mg) by mouth daily for depression.) 30 tablet 0     desogestrel-ethinyl estradiol (DESOGEN) 0.15-30 MG-MCG tablet          No Known Allergies    Family History   Problem Relation Age of Onset     Osteoarthritis Mother      Hypertension Mother      Hyperlipidemia Father      Cataracts Maternal Grandmother      Cataracts Paternal Grandmother      Osteoarthritis Paternal Grandmother      Heart Failure Paternal Grandmother      Breast Cancer Paternal Grandmother      Cancer Paternal Grandmother      Rheumatoid Arthritis Paternal Aunt      Glaucoma Paternal Aunt      Cerebrovascular Disease Paternal Grandfather      Arrhythmia Paternal Grandfather      Cancer Paternal Grandfather      Diabetes Paternal Grandfather      Myocardial Infarction Maternal Grandfather      Colon Cancer Maternal Grandfather      Thyroid Disease Other      Anesthesia Reaction No family hx of      Bleeding Disorder No family hx of      Thrombophilia No family hx of      Macular Degeneration No family hx of        Additional medical/Social/Surgical  histories reviewed in [facility] and updated as appropriate.     REVIEW OF SYSTEMS (12/6/2019)  A 10-point review of systems was obtained and is negative except for as noted in the HPI.      PHYSICAL EXAM  There were no vitals taken for this visit.    General  - normal appearance, in no obvious distress  CV  - normal pulses at posterior tib and dorsalis pedis  Pulm  - normal respiratory pattern, non-labored  Musculoskeletal - left ankle  - stance: Normal gait and stance, no leg length discrepancy.  - inspection: No swelling laterally, normal bone and joint alignment, no obvious deformity  - palpation: Minimalo te tenderness at peroneal tendons laterally inferior to lateral malleolus, nnderness over lateral or medial malleoli, navicular, or base of 5th MT  - ROM: intact globally  - strength: 4/5 in eversion, 5/5 in all other planes  - special tests:  (-) anterior drawer  (-) talar tilt  (-) Tinel's  (-) squeeze test  (-) Mayorga test  Neuro  - no sensory or motor deficit, grossly normal coordination, normal muscle tone  Skin  -no ecchymosis no warmth or induration, no obvious rash  Psych  - interactive, appropriate, normal mood and affect    IMAGING : X-ray left ankle 3 views. Final results and radiologist's interpretation, available in the [facility] health record. Images were reviewed with the patient/family members in the office today. My personal interpretation of the performed imaging is no acute osseous abnormality or substantial degenerative changes.     ASSESSMENT & PLAN  Ms. Casey is a 29 year old year old female who presents to clinic today with acute on chronic left ankle pain.  Although she does have JRA, I do suspect chronic ankle instability is more likely culprit.  Treatment options discussed and will begin with conservative measures at this time.    Diagnosis: Chronic pain of left ankle    -HEP provided for ankle proprioception and strengthening  -Trilock ankle brace provided  -Ice, analgesics PRN  -Follow up 6  weeks if no improvement, consider formal PT    It was a pleasure seeing Alka today.    Lalo Merritt DO, [facility]  Primary Care Sports Medicine      Again, thank you for allowing me to participate in the care of your patient.        Sincerely,        Lalo Merritt DO

## 2019-12-06 NOTE — NURSING NOTE
"      [facility]  12/6/2019    Alka Casey's chief complaint for this visit includes:  Chief Complaint   Patient presents with     Left Ankle - Pain     Juvenile rheumatoid arthritis     PCP: Rojelio Ivey    Referring Provider:  Chris Duff MD  [address] [place] [id number]    /71 (BP Location: Left arm, Patient Position: Sitting, Cuff Size: Adult Regular)   Pulse 87   Ht 1.619 m (5' 3.75\")   Wt 87.5 kg (193 lb)   SpO2 97%   BMI 33.39 kg/m          Reason for visit:     What part of your body is injured / painful?  left ankle    What caused the injury /pain? Juvenile rheumatoid arthritis, multiple sprains    How long ago did your injury occur or pain begin? several years ago    What are your most bothersome symptoms? Pain, Swelling, Weakness, Clicking, popping and Giving way or instability    How would you characterize your symptom?  aching and stabbing    What makes your symptoms better? Ice and Wrap or brace and elevation    What makes your symptoms worse? Standing and Walking    Have you been previously seen for this problem? Yes - rheumatology    Medical History:    Any recent changes to your medical history? No    Any new medication prescribed since last visit? No    Have you had surgery on this body part before? No    Social History:    Occupation:     Exercise: None - due to ankle pain and instability    Review of Systems:    Do you have fever, chills, weight loss? No    Do you have any vision problems? No    Do you have any chest pain or edema? No    Do you have any shortness of breath or wheezing?  No    Do you have stomach problems? No    Do you have any numbness or focal weakness? Yes, multiple joint weakness    Do you have diabetes? No    Do you have problems with bleeding or clotting? No    Do you have an rashes or other skin lesions? No    Sophie Elizabeth CMA           "

## 2019-12-06 NOTE — PATIENT INSTRUCTIONS
Thanks for coming today.  [facility]  [address] [place] [address]    To schedule future appointments in Ortho Clinic, you may call [phone].    To schedule ordered imaging by your provider:   Call Central [facility] [phone]    To schedule an injection ordered by your provider:  Call [facility] Injection scheduling line: [phone]  [employer] available online at:  [email or web address]    Please call if any further questions or concerns ([phone]).  Clinic hours 8 am to 5 pm.    Return to clinic (call) if symptoms worsen or fail to improve.    WHAT IS AN ANKLE SPRAIN:  Symptoms include pain, bruising, and swelling around ankle, often on outer side. The pain may be sharp with activity and dull at rest. You may be walking with a limp at first. The swelling is often present after the pain resolves. If your pain is severe, not improving over 5-7 days, or shoots up your leg, let your physician know as you may need crutches and an immobilizer.    Treatment:  -Ice 10-15 minutes several times a day for the first few days and then after activity.  -Walk as tolerated. Restrict other activities until pain allows. Biking, pool running or swimming are good alternative activities.  -You may benefit from an ankle brace for support while strengthening with therapy  -Some require immobilzation and crutches initially    Strengthening therapy  -Ice 10-15 minutes after activity. (or Ice bath 5-7min)  -often hip and ankle weakness leads to lower extremity (foot, ankle, shin, and knee) problems so a lot of focus will be on core strength and balance  - recommend yoga for core strengthening and stretching  -Perform exercises as instructed through handout or formal therapy if doing. Until then start with the following:  -ankle strengthening (additional below)   1) balance on one foot 1-2 min daily (perform on both feet)   2) arch raises- tighten bottom of foot like trying to  shorten foot and hold x 3-5  sec, repeat 5 times   3) ankle exercises (4 way with theraband)- 3 sets of 10-15 (fatigue) daily   5) heel raises on step-- once painfree lowering on both, start to slowly lower on  one foot    Return to activity guidelines:  -If it hurts, do not do it!  -wear ankle brace until pain free with full activity and exercises for at least 6 weeks  -Must meet each goal before return to play:   1) painfree jogging straight line   2) pain free sprints   3) pain free cutting/ changing directions      Ankle Sprain Exercises    As soon as it doesn t hurt too much to put pressure on the ball of your foot, start stretching your ankle using the towel stretch. When this stretch is easy, try the other exercises.    Towel stretch: Sit on a hard surface with your injured leg stretched out in front of you. Loop a towel around your toes and the ball of your foot and pull the towel toward your body keeping your leg straight. Hold this position for 15 to 30 seconds and then relax. Repeat 3 times.    Standing calf stretch: Stand facing a wall with your hands on the wall at about eye level. Keep your injured leg back with your heel on the floor. Keep the other leg forward with the knee bent. Turn your back foot slightly inward (as if you were pigeon-toed). Slowly lean into the wall until you feel a stretch in the back of your calf. Hold the stretch for 15 to 30 seconds. Return to the starting position. Repeat 3 times. Do this exercise several times each day.    Standing soleus stretch: Stand facing a wall with your hands on the wall at about chest height. Keep your injured leg back with your heel on the floor. Keep the other leg forward with the knee bent. Turn your back foot slightly inward (as if you were pigeon-toed). Bend your back knee slightly and gently lean into the wall until you feel a stretch in the lower calf of your injured leg. Hold the stretch for 15 to 30 seconds. Return to the starting position.  Repeat 3 times.    Ankle range of motion: Sit or lie down with your legs straight and your knees pointing toward the ceiling. Point your toes on your injured side toward your nose, then away from your body. Point your toes in toward your other foot and then out away from your other foot. Finally, move the top of your foot in circles. Move only your foot and ankle. Don't move your leg. Repeat 10 times in each direction. Push hard in all directions.  Resisted ankle dorsiflexion: Tie a knot in one end of the elastic tubing and shut the knot in a door. Tie a loop in the other end of the tubing and put the foot on your injured side through the loop so that the tubing goes around the top of the foot. Sit facing the door with your injured leg straight out in front of you. Move away from the door until there is tension in the tubing. Keeping your leg straight, pull the top of your foot toward your body, stretching the tubing. Slowly return to the starting position. Do 2 sets of 15.  Resisted ankle plantar flexion: Sit with your injured leg stretched out in front of you. Loop the tubing around the ball of your foot. Hold the ends of the tubing with both hands. Gently press the ball of your foot down and point your toes, stretching the tubing. Return to the starting position. Do 2 sets of 15.    Resisted ankle inversion: Sit with your legs stretched out in front of you. Cross the ankle of your uninjured leg over your other ankle. Wrap elastic tubing around the ball of the foot of your injured leg and then loop it around your other foot so that the tubing is anchored there at one end. Hold the other end of the tubing in your hand. Turn the foot of your injured leg inward and upward. This will stretch the tubing. Return to the starting position. Do 2 sets of 15.    Resisted ankle eversion: Sit with both legs stretched out in front of you, with your feet about a shoulder's width apart. Tie a loop in one end of elastic tubing.  Put the foot of your injured leg through the loop so that the tubing goes around the arch of that foot and wraps around the outside of the other foot. Hold onto the other end of the tubing with your hand to provide tension. Turn the foot of your injured leg up and out. Make sure you keep your other foot still so that it will allow the tubing to stretch as you move the foot of your injured leg. Return to the starting position. Do 2 sets of 15.  You may do the following exercises when you can stand on your injured ankle without pain.    Heel raise: Stand behind a chair or counter with both feet flat on the floor. Using the chair or counter as a support, rise up onto your toes and hold for 5 seconds. Then slowly lower yourself down without holding onto the support. (It's OK to keep holding onto the support if you need to.) When this exercise becomes less painful, try doing this exercise while you are standing on the injured leg only. Repeat 15 times. Do 2 sets of 15. Rest 30 seconds between sets.    Step-up: Stand with the foot of your injured leg on a support 3 to 5 inches (8 to 13 centimeters) high --like a small step or block of wood. Keep your other foot flat on the floor. Shift your weight onto the injured leg on the support. Straighten your injured leg as the other leg comes off the floor. Return to the starting position by bending your injured leg and slowly lowering your uninjured leg back to the floor. Do 2 sets of 15.    Balance and reach exercises: Stand next to a chair with your injured leg farther from the chair. The chair will provide support if you need it. Stand on the foot of your injured leg and bend your knee slightly. Try to raise the arch of this foot while keeping your big toe on the floor. Keep your foot in this position.    With the hand that is farther away from the chair, reach forward in front of you by bending at the waist. Avoid bending your knee any more as you do this. Repeat this 15  times. To make the exercise more challenging, reach farther in front of you. Do 2 sets of 15.    While keeping your arch raised, reach the hand that is farther away from the chair across your body toward the chair. The farther you reach, the more challenging the exercise. Do 2 sets of 15.    Side-lying leg lift: Lie on your uninjured side. Tighten the front thigh muscles on your injured leg and lift that leg 8 to 10 inches (20 to 25 centimeters) away from the other leg. Keep the leg straight and lower it slowly. Do 2 sets of 15.  If you have access to a wobble board, do the following exercises:    Wobble board exercises  Stand on a wobble board with your feet shoulder-width apart.    Rock the board forwards and backwards 30 times, then side to side 30 times. Hold on to a chair if you need support.  Rotate the wobble board around so that the edge of the board is in contact with the floor at all times. Do this 30 times in a clockwise and then a counterclockwise direction.  Balance on the wobble board for as long as you can without letting the edges touch the floor. Try to do this for 2 minutes without touching the floor.  Rotate the wobble board in clockwise and counterclockwise circles, but do not let the edge of the board touch the floor.  When you have mastered the wobble exercises standing on both legs, try repeating them while standing on just your injured leg. After you are able to do these exercises on one leg, try to do them with your eyes closed. Make sure you have something nearby to support you in case you lose your balance.    Developed by [employer].  Published by [employer].  Copyright  2014 [employer] and/or one of its subsidiaries. All rights reserved.

## 2020-01-28 ENCOUNTER — MYC MEDICAL ADVICE (OUTPATIENT)
Dept: RHEUMATOLOGY | Facility: CLINIC | Age: 30
End: 2020-01-28

## 2020-03-23 ENCOUNTER — TELEPHONE (OUTPATIENT)
Dept: RHEUMATOLOGY | Facility: CLINIC | Age: 30
End: 2020-03-23

## 2020-03-23 NOTE — TELEPHONE ENCOUNTER
Left VM for patient to return clinics call to either reschedule appointment with Dr. uDff or change to a telephone visit due to the covid-19. Marilee Cheney CMA

## 2020-03-23 NOTE — TELEPHONE ENCOUNTER
Called patient and discussed Dr. Duff's recommendations.     CHARLES CedenoN, RN   Rheumatology Care Coordinator   [facility]          Chris Duff MD  You 2 minutes ago (2:53 PM)       She can delay her labs until Sept.    Routing comment

## 2020-03-23 NOTE — TELEPHONE ENCOUNTER
Patient changed her appt to a telephone visit and would like to know when to schedule labs. Please advise

## 2020-03-27 ENCOUNTER — VIRTUAL VISIT (OUTPATIENT)
Dept: RHEUMATOLOGY | Facility: CLINIC | Age: 30
End: 2020-03-27
Payer: COMMERCIAL

## 2020-03-27 DIAGNOSIS — M08.00 JUVENILE RHEUMATOID ARTHRITIS (H): Primary | ICD-10-CM

## 2020-03-27 DIAGNOSIS — Z79.60 LONG-TERM USE OF IMMUNOSUPPRESSANT MEDICATION: ICD-10-CM

## 2020-03-27 PROCEDURE — 99213 OFFICE O/P EST LOW 20 MIN: CPT | Mod: TEL | Performed by: INTERNAL MEDICINE

## 2020-03-27 RX ORDER — HYDROXYCHLOROQUINE SULFATE 200 MG/1
400 TABLET, FILM COATED ORAL DAILY
Qty: 180 TABLET | Refills: 3 | Status: SHIPPED | OUTPATIENT
Start: 2020-03-27 | End: 2020-04-06

## 2020-03-27 NOTE — PATIENT INSTRUCTIONS
Arrange for lab testing in the next month  Arrange for OCT eye testing in the next 6 months  Plan clinic follow up in 6 months

## 2020-03-27 NOTE — PROGRESS NOTES
"Alka Casey is a 29 year old female who is being evaluated via a billable telephone visit.      The patient has been notified of following:     \"This telephone visit will be conducted via a call between you and your physician/provider. We have found that certain health care needs can be provided without the need for a physical exam.  This service lets us provide the care you need with a short phone conversation.  If a prescription is necessary we can send it directly to your pharmacy.  If lab work is needed we can place an order for that and you can then stop by our lab to have the test done at a later time.    If during the course of the call the physician/provider feels a telephone visit is not appropriate, you will not be charged for this service.\"     Marilee Cheney CMA      Alka Casey complains of   Chief Complaint   Patient presents with     RECHECK     Juvenile rheumatoid arthritis        I have reviewed and updated the patient's Past Medical History, Social History, Family History and Medication List.    ALLERGIES  Patient has no known allergies.    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 -DELL, -CCP, -RF, -Ro. No erosions.    Alka is pretty good. She has some right index finger with swelling and pain for the past 2 weeks. This will typically last for 2 weeks, as she has had this before. She relates to increased activity at that joint. She generally has not been using any analgesic or NSAID. Energy is stable. No AM stiffness. Her joint function is stable and no new deformities.     She is now on the hydroxychloroquine 400 mg warren with good tolerance. She does need a new OCT exam, but deferring due to COVID-19 concern.     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 , one son. No tobacco, social EtOH, no illicit drugs. No exposures to infectious hepatitis or HIV. Right handed.     ROS:  +occasional dysphagia  +finger tingling with swelling  +occasional headache  +increased depression  +allergies to pets and seasonal allergies  Remainder of the 14 point ROS obtained and found negative.    Component      Latest Ref Rng & Units 9/20/2019   WBC      4.0 - 11.0 10e9/L 6.3   RBC Count      3.8 - 5.2 10e12/L 5.01   Hemoglobin      11.7 - 15.7 g/dL 13.0   Hematocrit      35.0 - 47.0 % 39.9   MCV      78 - 100 fl 80   MCH      26.5 - 33.0 pg 25.9 (L)   MCHC      31.5 - 36.5 g/dL 32.6   RDW      10.0 - 15.0 % 15.9 (H)   Platelet Count      150 - 450 10e9/L 292   Creatinine      0.52 - 1.04 mg/dL 0.72   GFR Estimate      >60 mL/min/1.73:m2 >90   GFR Estimate If Black      >60 mL/min/1.73:m2 >90   Albumin      3.4 - 5.0 g/dL 3.6   ALT      0 - 50 U/L 29   AST      0 - 45 U/L 30     Study Result     3 views bilateral hand and 3 views bilateral wrist radiographs  9/20/2019 10:19 AM     History: Juvenile rheumatoid arthritis (H)     Comparison: March 31, 2017     Findings:     PA, oblique and lateral view(s) of the each hand(s) and wrists were  obtained.      Left:     No acute osseous abnormality.  No erosion.     No substantial degenerative change.     Soft tissue is unremarkable.     Right:     No acute osseous abnormality.  No erosion.     No substantial change change.     Soft tissue is unremarkable.                                                                      Impression:  1. No radiographic evidence for inflammatory arthritis in either hand  or wrist.  2. No substantial degenerative change.     LUCIANA TRUJILLO       Assessment/Plan:  1.  Juvenile rheumatoid arthritis  Pauciarticular Chronic Juvenile Rheumatoid Arthritis now with relatively low disease activity. Just intermittent mild joint flares. Generally stable comfort and function. Good tolerance of the hydroxychloroquine and we will continue this regimen. Get lab testing with inflammatory marker in the next month. Plan RTC in 6 months.  - hydroxychloroquine (PLAQUENIL) 200 MG tablet; Take 2 tablets (400 mg) by mouth daily Annual Plaquenil toxicity eye screening required.  Dispense: 180 tablet; Refill: 3  - CRP inflammation; Standing  - Erythrocyte sedimentation rate auto; Standing    2. Long-term use of immunosuppressant medication  Needs eye screening in the next 6 months, and repeat labs in one month.  - hydroxychloroquine (PLAQUENIL) 200 MG tablet; Take 2 tablets (400 mg) by mouth daily Annual Plaquenil toxicity eye screening required.  Dispense: 180 tablet; Refill: 3  - CRP inflammation; Standing  - Erythrocyte sedimentation rate auto; Standing    Phone call duration: 14 minutes    Chris Duff MD

## 2020-04-01 ENCOUNTER — TELEPHONE (OUTPATIENT)
Dept: RHEUMATOLOGY | Facility: CLINIC | Age: 30
End: 2020-04-01

## 2020-04-01 NOTE — TELEPHONE ENCOUNTER
attempted to reach the patient and schedule a 6 month return visit with Dr. Duff, lab in April and an appointment with Ophthalmology for plaquenil testing.    CALL CENTER OK TO SCHEDULE ALL APPOINTMENTS    [facility]  Adult Med Spec/Surg Spec   [phone]      .

## 2020-04-06 ENCOUNTER — TELEPHONE (OUTPATIENT)
Dept: NEUROLOGY | Facility: CLINIC | Age: 30
End: 2020-04-06

## 2020-04-06 DIAGNOSIS — M08.00 JUVENILE RHEUMATOID ARTHRITIS (H): ICD-10-CM

## 2020-04-06 DIAGNOSIS — Z79.60 LONG-TERM USE OF IMMUNOSUPPRESSANT MEDICATION: ICD-10-CM

## 2020-04-06 RX ORDER — HYDROXYCHLOROQUINE SULFATE 200 MG/1
400 TABLET, FILM COATED ORAL DAILY
Qty: 180 TABLET | Refills: 3 | Status: SHIPPED | OUTPATIENT
Start: 2020-04-06 | End: 2020-09-25

## 2020-04-06 NOTE — TELEPHONE ENCOUNTER
Pt currently takes:  hydroxychloroquine (PLAQUENIL) 200 MG tablet  180 tablet  3  3/27/2020   No    Sig - Route: Take 2 tablets (400 mg) by mouth daily Annual Plaquenil toxicity eye screening required. -       [facility] has sent a fax and requested a replacement since there is a nationwide shortage of Hydroxychloroquine.      New Rx can be faxed to [phone]    Phone- [phone]    [facility] [phone]    Sussy Bullard LPN

## 2020-04-06 NOTE — TELEPHONE ENCOUNTER
Filled per fulfillment protocol     PRISCILLA Cedeno, RN   Rheumatology Care Coordinator   [facility]

## 2020-09-04 DIAGNOSIS — M08.00 JUVENILE RHEUMATOID ARTHRITIS (H): ICD-10-CM

## 2020-09-04 DIAGNOSIS — Z79.60 LONG-TERM USE OF IMMUNOSUPPRESSANT MEDICATION: ICD-10-CM

## 2020-09-04 LAB
ALBUMIN SERPL-MCNC: 3.5 G/DL (ref 3.4–5)
ALBUMIN UR-MCNC: NEGATIVE MG/DL
ALT SERPL W P-5'-P-CCNC: 39 U/L (ref 0–50)
APPEARANCE UR: CLEAR
AST SERPL W P-5'-P-CCNC: 30 U/L (ref 0–45)
BASOPHILS # BLD AUTO: 0 10E9/L (ref 0–0.2)
BASOPHILS NFR BLD AUTO: 0.4 %
BILIRUB UR QL STRIP: NEGATIVE
COLOR UR AUTO: YELLOW
CREAT SERPL-MCNC: 0.69 MG/DL (ref 0.52–1.04)
CRP SERPL-MCNC: 6 MG/L (ref 0–8)
DIFFERENTIAL METHOD BLD: NORMAL
EOSINOPHIL # BLD AUTO: 0.4 10E9/L (ref 0–0.7)
EOSINOPHIL NFR BLD AUTO: 7.7 %
ERYTHROCYTE [DISTWIDTH] IN BLOOD BY AUTOMATED COUNT: 13.8 % (ref 10–15)
ERYTHROCYTE [SEDIMENTATION RATE] IN BLOOD BY WESTERGREN METHOD: 5 MM/H (ref 0–20)
GFR SERPL CREATININE-BSD FRML MDRD: >90 ML/MIN/{1.73_M2}
GLUCOSE UR STRIP-MCNC: NEGATIVE MG/DL
HCT VFR BLD AUTO: 41.9 % (ref 35–47)
HGB BLD-MCNC: 14.1 G/DL (ref 11.7–15.7)
HGB UR QL STRIP: ABNORMAL
KETONES UR STRIP-MCNC: NEGATIVE MG/DL
LEUKOCYTE ESTERASE UR QL STRIP: NEGATIVE
LYMPHOCYTES # BLD AUTO: 1.4 10E9/L (ref 0.8–5.3)
LYMPHOCYTES NFR BLD AUTO: 28.8 %
MCH RBC QN AUTO: 28.6 PG (ref 26.5–33)
MCHC RBC AUTO-ENTMCNC: 33.7 G/DL (ref 31.5–36.5)
MCV RBC AUTO: 85 FL (ref 78–100)
MONOCYTES # BLD AUTO: 0.3 10E9/L (ref 0–1.3)
MONOCYTES NFR BLD AUTO: 7.1 %
NEUTROPHILS # BLD AUTO: 2.7 10E9/L (ref 1.6–8.3)
NEUTROPHILS NFR BLD AUTO: 56 %
NITRATE UR QL: NEGATIVE
NON-SQ EPI CELLS #/AREA URNS LPF: ABNORMAL /LPF
PH UR STRIP: 7 PH (ref 5–7)
PLATELET # BLD AUTO: 248 10E9/L (ref 150–450)
RBC # BLD AUTO: 4.93 10E12/L (ref 3.8–5.2)
RBC #/AREA URNS AUTO: ABNORMAL /HPF
SOURCE: ABNORMAL
SP GR UR STRIP: 1.02 (ref 1–1.03)
UROBILINOGEN UR STRIP-ACNC: 0.2 EU/DL (ref 0.2–1)
WBC # BLD AUTO: 4.8 10E9/L (ref 4–11)
WBC #/AREA URNS AUTO: ABNORMAL /HPF

## 2020-09-04 PROCEDURE — 84460 ALANINE AMINO (ALT) (SGPT): CPT | Performed by: INTERNAL MEDICINE

## 2020-09-04 PROCEDURE — 86140 C-REACTIVE PROTEIN: CPT | Performed by: INTERNAL MEDICINE

## 2020-09-04 PROCEDURE — 82565 ASSAY OF CREATININE: CPT | Performed by: INTERNAL MEDICINE

## 2020-09-04 PROCEDURE — 82040 ASSAY OF SERUM ALBUMIN: CPT | Performed by: INTERNAL MEDICINE

## 2020-09-04 PROCEDURE — 36415 COLL VENOUS BLD VENIPUNCTURE: CPT | Performed by: INTERNAL MEDICINE

## 2020-09-04 PROCEDURE — 85652 RBC SED RATE AUTOMATED: CPT | Performed by: INTERNAL MEDICINE

## 2020-09-04 PROCEDURE — 85025 COMPLETE CBC W/AUTO DIFF WBC: CPT | Performed by: INTERNAL MEDICINE

## 2020-09-04 PROCEDURE — 84450 TRANSFERASE (AST) (SGOT): CPT | Performed by: INTERNAL MEDICINE

## 2020-09-04 PROCEDURE — 81001 URINALYSIS AUTO W/SCOPE: CPT | Performed by: INTERNAL MEDICINE

## 2020-09-25 ENCOUNTER — VIRTUAL VISIT (OUTPATIENT)
Dept: RHEUMATOLOGY | Facility: CLINIC | Age: 30
End: 2020-09-25
Payer: COMMERCIAL

## 2020-09-25 DIAGNOSIS — Z79.60 LONG-TERM USE OF IMMUNOSUPPRESSANT MEDICATION: ICD-10-CM

## 2020-09-25 DIAGNOSIS — F33.0 MILD EPISODE OF RECURRENT MAJOR DEPRESSIVE DISORDER (H): ICD-10-CM

## 2020-09-25 DIAGNOSIS — K20.0 EOSINOPHILIC ESOPHAGITIS: ICD-10-CM

## 2020-09-25 DIAGNOSIS — M08.00 JUVENILE RHEUMATOID ARTHRITIS (H): Primary | ICD-10-CM

## 2020-09-25 PROCEDURE — 99213 OFFICE O/P EST LOW 20 MIN: CPT | Mod: 95 | Performed by: INTERNAL MEDICINE

## 2020-09-25 RX ORDER — NORETHINDRONE AND ETHINYL ESTRADIOL 0.5-0.035
1 KIT ORAL DAILY
COMMUNITY
Start: 2020-09-23 | End: 2023-05-01

## 2020-09-25 RX ORDER — HYDROXYCHLOROQUINE SULFATE 200 MG/1
400 TABLET, FILM COATED ORAL DAILY
Qty: 180 TABLET | Refills: 3 | Status: SHIPPED | OUTPATIENT
Start: 2020-09-25 | End: 2021-03-26

## 2020-09-25 NOTE — PATIENT INSTRUCTIONS
Continue the hydroxychloroquine  May add tylenol as needed  Increase aerobic exercise component  Follow up with me in 6 months with lab testing

## 2020-09-25 NOTE — PROGRESS NOTES
"Alka Casey is a 30 year old female who is being evaluated via a billable telephone visit.      The patient has been notified of following:     \"This telephone visit will be conducted via a call between you and your physician/provider. We have found that certain health care needs can be provided without the need for a physical exam.  This service lets us provide the care you need with a short phone conversation.  If a prescription is necessary we can send it directly to your pharmacy.  If lab work is needed we can place an order for that and you can then stop by our lab to have the test done at a later time.    Telephone visits are billed at different rates depending on your insurance coverage. During this emergency period, for some insurers they may be billed the same as an in-person visit.  Please reach out to your insurance provider with any questions.    If during the course of the call the physician/provider feels a telephone visit is not appropriate, you will not be charged for this service.\"    Patient has given verbal consent for Telephone visit?  Yes    What phone number would you like to be contacted at? [phone]    How would you like to obtain your AVS? Erika       Ms. 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 -DELL, -CCP, -RF, -Ro. No erosions.    Her joints are generally okay. She does have increased hand soreness and swelling intermittently. She also has left thumb synovial cyst. Her energy is low post partum. AM stiffness is nil, and she feels she sleeps normally. She admits to functional problems knitting due to increased pain and swelling with this activity. No joint deformities.     Stable mood and esophagitis now improved with good control of allergies. No current use of proton pump inhibitor.    She tolerates the hydroxychloroquine well and has had normal eye checking, but no recent OCT. No use of NSAID or tylenol.    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 No tobacco, social EtOH, no illicit drugs. No exposures to infectious hepatitis or HIV. Right handed.     PMSF history personally obtained and updated by me today.    ROS:  +occasional diarrhea  +allergies to pets and seasonal allergies  Remainder of the 14 point ROS obtained and found negative.    Laboratory:    Component      Latest Ref Rng & Units 2020   WBC      4.0 - 11.0 10e9/L 4.8   RBC Count      3.8 - 5.2 10e12/L 4.93   Hemoglobin      11.7 - 15.7 g/dL 14.1   Hematocrit      35.0 - 47.0 % 41.9   MCV      78 - 100 fl 85   MCH      26.5 - 33.0 pg 28.6   MCHC      31.5 - 36.5 g/dL 33.7   RDW      10.0 - 15.0 % 13.8   Platelet Count      150 - 450 10e9/L 248   % Neutrophils      % 56.0   % Lymphocytes      % 28.8   % Monocytes      % 7.1   % Eosinophils      % 7.7   % Basophils      % 0.4   Absolute Neutrophil      1.6 - 8.3 10e9/L 2.7   Absolute Lymphocytes      0.8 - 5.3 10e9/L 1.4   Absolute Monocytes      0.0 - 1.3 10e9/L 0.3   Absolute Eosinophils      0.0 - 0.7 10e9/L 0.4   Absolute Basophils      0.0 - 0.2 10e9/L 0.0   Diff Method       Automated Method   Color Urine       Yellow   Appearance Urine       Clear   Glucose Urine      NEG:Negative mg/dL Negative   Bilirubin Urine      NEG:Negative Negative   Ketones Urine      NEG:Negative mg/dL Negative   Specific Gravity Urine      1.003 - 1.035 1.025   pH Urine      5.0 - 7.0 pH 7.0   Protein Albumin Urine      NEG:Negative mg/dL Negative   Urobilinogen Urine      0.2 - 1.0 EU/dL 0.2   Nitrite  Urine      NEG:Negative Negative   Blood Urine      NEG:Negative Small (A)   Leukocyte Esterase Urine      NEG:Negative Negative   Source       Midstream Urine   WBC Urine      OTO5:0 - 5 /HPF 0 - 5   RBC Urine      OTO2:O - 2 /HPF O - 2   Squamous Epithelial /LPF Urine      FEW:Few /LPF Few   Creatinine      0.52 - 1.04 mg/dL 0.69   GFR Estimate      >60 mL/min/1.73:m2 >90   GFR Estimate If Black      >60 mL/min/1.73:m2 >90   Sed Rate      0 - 20 mm/h 5   CRP Inflammation      0.0 - 8.0 mg/L 6.0   AST      0 - 45 U/L 30   ALT      0 - 50 U/L 39   Albumin      3.4 - 5.0 g/dL 3.5       Impression:    Pauciarticular Chronic Juvenile Rheumatoid Arthritis-with minor arthritis symptoms but no signs of systemic inflammation. Xrays a year ago remained unremarkable. This leads me to believe that she continues to be treated to target. Good tolerance of the hydroxychloroquine and we will continue this agent.    Eosinophilic esophagitis-likely aggravated by allergy. Now stable and no important symptoms. Avoid NSAID.    Depression-likely contributes to pain amplification. Now stable on zoloft. Increase aerobic exercise.    Long term management of immunosuppression-with stable labs today. Plan repeat eye check with OCT per ophthalmology.    RTC in 6 months.          Phone call duration: 16 minutes    Chris Duff MD

## 2021-01-09 ENCOUNTER — HEALTH MAINTENANCE LETTER (OUTPATIENT)
Age: 31
End: 2021-01-09

## 2021-03-13 ENCOUNTER — IMMUNIZATION (OUTPATIENT)
Dept: FAMILY MEDICINE | Facility: CLINIC | Age: 31
End: 2021-03-13
Payer: COMMERCIAL

## 2021-03-13 PROCEDURE — 91301 PR COVID VAC MODERNA 100 MCG/0.5 ML IM: CPT

## 2021-03-13 PROCEDURE — 0011A PR COVID VAC MODERNA 100 MCG/0.5 ML IM: CPT

## 2021-03-26 ENCOUNTER — VIRTUAL VISIT (OUTPATIENT)
Dept: RHEUMATOLOGY | Facility: CLINIC | Age: 31
End: 2021-03-26
Payer: COMMERCIAL

## 2021-03-26 DIAGNOSIS — Z79.60 LONG-TERM USE OF IMMUNOSUPPRESSANT MEDICATION: ICD-10-CM

## 2021-03-26 DIAGNOSIS — K20.0 EOSINOPHILIC ESOPHAGITIS: ICD-10-CM

## 2021-03-26 DIAGNOSIS — M08.00 JUVENILE RHEUMATOID ARTHRITIS (H): Primary | ICD-10-CM

## 2021-03-26 PROCEDURE — 99215 OFFICE O/P EST HI 40 MIN: CPT | Mod: 95 | Performed by: INTERNAL MEDICINE

## 2021-03-26 RX ORDER — HYDROXYCHLOROQUINE SULFATE 200 MG/1
400 TABLET, FILM COATED ORAL DAILY
Qty: 180 TABLET | Refills: 3 | Status: SHIPPED | OUTPATIENT
Start: 2021-03-26 | End: 2021-09-03

## 2021-03-26 NOTE — PATIENT INSTRUCTIONS
Continue the hydroxychloroquine  Get OCT eye test and send us the results  Contact ophthalmology at [phone] to schedule the plaquenil eye exam   Get upper endoscopy  For the endoscopy, If you have not heard from the scheduling office within 2 business days, please call [phone]  Get laboratory testing now and every 6 months  Follow up with me in 6 months with lab testing

## 2021-03-26 NOTE — PROGRESS NOTES
Alka is a 30 year old who is being evaluated via a billable video visit.      Video visit - please text invite to: [phone] (H)    How would you like to obtain your AVS? Daisyhart  If the video visit is dropped, the invitation should be resent by: Text to cell phone: [phone] (H)  Will anyone else be joining your video visit? No       Jose Roa LPN  Pulmonary Medicine:  [facility]  Phone: [phone] Fax: [phone]        Ms. 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 -DELL, -CCP, -RF, -Ro. No erosions.    She feels pretty good. She will have occasional flares of joint disease in her hands, particularly the left thumb IP and 2nd PIP joint with swelling, stiffness and pain. This happens once monthly. Energy is good.  No real AM stiffness. She is cycling for exercise.     She is being COVID vaccinated right now.     Hydroxychloroquine 400 mg daily. She needs an eye check, with negative eye evaluation one year ago. No other analgesic use. She is anticipating another pregnancy and may stop the hydroxychloroquine at that.    PMI:  Medical-asthma, juvenile arthritis, low back facet lumbar syndrome, chronic depression, eosinophilic esophagitis, vitamin D insufficiency  Surgical-wisdom tooth extractions,   Injuries-toe fractures    FH:  GM with CHF  GM with colon cancer  Aunts, uncles with RA  Mother with OA, dysrhythmia, depression, and fibromyalgia  Father with dysrhythmia, allergies and foot  Sister is chronic anxiety  Son is healthy    SH:  Works as an , training for [employer]. , one son. No tobacco, social EtOH, no illicit drugs. No exposures to infectious hepatitis or HIV. Right handed.     PMSF history personally obtained and updated by me today.    ROS:  +dysphagia for dry foods  +lactose intolerance and occasional diarrhea  +allergies to pets and seasonal allergies  Remainder of the 14 point ROS obtained and found  negative.    Physical Exam:  Constitutional: WD-WN-WG cooperative; obese  Eyes: nl EOM, conjunctiva, sclera  ENT: nl external ears, nose, hearing, lips; +modest parotid swelling  Neck: no visible thyroid enlargement  MS:   All  neck, shoulder, elbow, wrist, hand joints were examined and otherwise found normal. Normal tuck and prayer.  Skin: no alopecia, rash  Neuro: nl cranial nerves  Psych: nl judgement, affect.    Laboratory:    Component      Latest Ref Rng & Units 7/18/2014 12/5/2014   DELL Screen by EIA      <1.0 <1.0 . . .    Rheumatoid Factor      <20 IU/mL <20    SSA (Ro) (ART) Antibody, IgG      0.0 - 0.9 AI  <0.2 . . .     Impression:    Pauciarticular Chronic Juvenile Rheumatoid Arthritis-this remains treated to near target with hydroxychloroquine monotherapy. Continued minor intermittent symptomatic joint pains and swelling, but no evidence of functional loss or disease progression. She is happy with her regimen and uninterested in stronger DMARD therapy. She is contemplating pregnancy and wishes to hold the hydroxychloroquine during this period and I think this is reasonable. Good tolerance of the hydroxchloroquine and we will continue the regimen. Plan to order surveillance Xrays next visit. Get inflammatory markers.    Eosinophilic esophagitis-with increasing dysphagia and we will plan to repeat endoscopy. Get CBC with differential.    Long term management of immunosuppression-with labs planned for now. Plan repeat eye check with OCT per ophthalmology.    RTC in 6 months.          A total of 40 minutes was spent in face to face patient interaction and chart review on the day of service.      Chris Duff MD        Video Start Time: 8:47 AM      Video-Visit Details    Type of service:  Video Visit    Video End Time:9:18 AM    Originating Location (pt. Location): Home    Distant Location (provider location):  [facility]     [facility]

## 2021-04-10 ENCOUNTER — IMMUNIZATION (OUTPATIENT)
Dept: FAMILY MEDICINE | Facility: CLINIC | Age: 31
End: 2021-04-10
Attending: FAMILY MEDICINE
Payer: COMMERCIAL

## 2021-04-10 PROCEDURE — 91301 PR COVID VAC MODERNA 100 MCG/0.5 ML IM: CPT

## 2021-04-10 PROCEDURE — 0012A PR COVID VAC MODERNA 100 MCG/0.5 ML IM: CPT

## 2021-04-19 DIAGNOSIS — Z11.59 ENCOUNTER FOR SCREENING FOR OTHER VIRAL DISEASES: ICD-10-CM

## 2021-04-27 DIAGNOSIS — Z11.59 ENCOUNTER FOR SCREENING FOR OTHER VIRAL DISEASES: ICD-10-CM

## 2021-04-27 LAB
SARS-COV-2 RNA RESP QL NAA+PROBE: NORMAL
SPECIMEN SOURCE: NORMAL

## 2021-04-27 PROCEDURE — U0003 INFECTIOUS AGENT DETECTION BY NUCLEIC ACID (DNA OR RNA); SEVERE ACUTE RESPIRATORY SYNDROME CORONAVIRUS 2 (SARS-COV-2) (CORONAVIRUS DISEASE [COVID-19]), AMPLIFIED PROBE TECHNIQUE, MAKING USE OF HIGH THROUGHPUT TECHNOLOGIES AS DESCRIBED BY CMS-2020-01-R: HCPCS | Performed by: INTERNAL MEDICINE

## 2021-04-27 PROCEDURE — U0005 INFEC AGEN DETEC AMPLI PROBE: HCPCS | Performed by: INTERNAL MEDICINE

## 2021-04-28 LAB
LABORATORY COMMENT REPORT: NORMAL
SARS-COV-2 RNA RESP QL NAA+PROBE: NEGATIVE
SPECIMEN SOURCE: NORMAL

## 2021-04-28 ASSESSMENT — MIFFLIN-ST. JEOR: SCORE: 1553.69

## 2021-04-30 ENCOUNTER — HOSPITAL ENCOUNTER (OUTPATIENT)
Facility: AMBULATORY SURGERY CENTER | Age: 31
Discharge: HOME | End: 2021-04-30
Attending: INTERNAL MEDICINE | Admitting: INTERNAL MEDICINE
Payer: COMMERCIAL

## 2021-04-30 VITALS
SYSTOLIC BLOOD PRESSURE: 126 MMHG | BODY MASS INDEX: 31.76 KG/M2 | HEIGHT: 64 IN | HEART RATE: 93 BPM | TEMPERATURE: 97.1 F | DIASTOLIC BLOOD PRESSURE: 55 MMHG | WEIGHT: 186 LBS | RESPIRATION RATE: 16 BRPM | OXYGEN SATURATION: 97 %

## 2021-04-30 DIAGNOSIS — K20.0 EOSINOPHILIC ESOPHAGITIS: Primary | ICD-10-CM

## 2021-04-30 LAB — UPPER GI ENDOSCOPY: NORMAL

## 2021-04-30 PROCEDURE — 43239 EGD BIOPSY SINGLE/MULTIPLE: CPT

## 2021-04-30 PROCEDURE — G8907 PT DOC NO EVENTS ON DISCHARG: HCPCS

## 2021-04-30 PROCEDURE — G8918 PT W/O PREOP ORDER IV AB PRO: HCPCS

## 2021-04-30 PROCEDURE — 88305 TISSUE EXAM BY PATHOLOGIST: CPT | Performed by: PATHOLOGY

## 2021-04-30 RX ORDER — FLUMAZENIL 0.1 MG/ML
0.2 INJECTION, SOLUTION INTRAVENOUS
Status: ACTIVE | OUTPATIENT
Start: 2021-04-30 | End: 2021-04-30

## 2021-04-30 RX ORDER — PROCHLORPERAZINE MALEATE 10 MG
10 TABLET ORAL EVERY 6 HOURS PRN
Status: DISCONTINUED | OUTPATIENT
Start: 2021-04-30 | End: 2021-05-01 | Stop reason: HOSPADM

## 2021-04-30 RX ORDER — PANTOPRAZOLE SODIUM 40 MG/1
40 TABLET, DELAYED RELEASE ORAL 2 TIMES DAILY
Qty: 60 TABLET | Refills: 1 | Status: SHIPPED | OUTPATIENT
Start: 2021-04-30 | End: 2021-06-24

## 2021-04-30 RX ORDER — DIPHENHYDRAMINE HYDROCHLORIDE 50 MG/ML
INJECTION INTRAMUSCULAR; INTRAVENOUS PRN
Status: DISCONTINUED | OUTPATIENT
Start: 2021-04-30 | End: 2021-04-30 | Stop reason: HOSPADM

## 2021-04-30 RX ORDER — ONDANSETRON 2 MG/ML
4 INJECTION INTRAMUSCULAR; INTRAVENOUS
Status: DISCONTINUED | OUTPATIENT
Start: 2021-04-30 | End: 2021-05-01 | Stop reason: HOSPADM

## 2021-04-30 RX ORDER — NALOXONE HYDROCHLORIDE 0.4 MG/ML
0.2 INJECTION, SOLUTION INTRAMUSCULAR; INTRAVENOUS; SUBCUTANEOUS
Status: DISCONTINUED | OUTPATIENT
Start: 2021-04-30 | End: 2021-05-01 | Stop reason: HOSPADM

## 2021-04-30 RX ORDER — ONDANSETRON 4 MG/1
4 TABLET, ORALLY DISINTEGRATING ORAL EVERY 6 HOURS PRN
Status: DISCONTINUED | OUTPATIENT
Start: 2021-04-30 | End: 2021-05-01 | Stop reason: HOSPADM

## 2021-04-30 RX ORDER — NALOXONE HYDROCHLORIDE 0.4 MG/ML
0.4 INJECTION, SOLUTION INTRAMUSCULAR; INTRAVENOUS; SUBCUTANEOUS
Status: DISCONTINUED | OUTPATIENT
Start: 2021-04-30 | End: 2021-05-01 | Stop reason: HOSPADM

## 2021-04-30 RX ORDER — ONDANSETRON 2 MG/ML
4 INJECTION INTRAMUSCULAR; INTRAVENOUS EVERY 6 HOURS PRN
Status: DISCONTINUED | OUTPATIENT
Start: 2021-04-30 | End: 2021-05-01 | Stop reason: HOSPADM

## 2021-04-30 RX ORDER — LIDOCAINE 40 MG/G
CREAM TOPICAL
Status: DISCONTINUED | OUTPATIENT
Start: 2021-04-30 | End: 2021-05-01 | Stop reason: HOSPADM

## 2021-04-30 RX ORDER — FENTANYL CITRATE 50 UG/ML
INJECTION, SOLUTION INTRAMUSCULAR; INTRAVENOUS PRN
Status: DISCONTINUED | OUTPATIENT
Start: 2021-04-30 | End: 2021-04-30 | Stop reason: HOSPADM

## 2021-05-03 ENCOUNTER — TELEPHONE (OUTPATIENT)
Dept: GASTROENTEROLOGY | Facility: CLINIC | Age: 31
End: 2021-05-03

## 2021-05-03 NOTE — TELEPHONE ENCOUNTER
LPN left message for patient requesting a return call to schedule follow up per message below.     Josy Giles,   P [facility] Gastroenterology-Adult-[place]             Hey -   Can you just put this patient in for a follow-up appointment with me sometime in the next few months - virtual is fine - its to follow-up EoE.   Thanks,   Josy Gutiérrez LPN

## 2021-05-04 ENCOUNTER — TELEPHONE (OUTPATIENT)
Dept: GASTROENTEROLOGY | Facility: CLINIC | Age: 31
End: 2021-05-04

## 2021-05-04 LAB — COPATH REPORT: NORMAL

## 2021-05-04 NOTE — TELEPHONE ENCOUNTER
Prior Authorization Retail Medication Request    Medication/Dose: pantoprazole (PROTONIX) 40 MG EC tablet  Patient Sig: Take 1 tablet (40 mg) by mouth 2 times daily  ICD code (if different than what is on RX):    Previously Tried and Failed:    Rationale:      Insurance Name:    Insurance ID:        Pharmacy Information (if different than what is on RX)  Name:    Phone:

## 2021-05-04 NOTE — TELEPHONE ENCOUNTER
LPN left 2nd voicemail for patient requesting a return call to schedule follow up appointment with Dr. Giles. See previous note.     Dariela Gutiérrez LPN

## 2021-05-04 NOTE — TELEPHONE ENCOUNTER
[facility] Call Center    Phone Message    May a detailed message be left on voicemail: no     Reason for Call: Other: pharmacy calling again about this, please advise with them     Action Taken: Message routed to:  Adult Clinics: Gastroenterology (GI) p [id number]    Travel Screening: Not Applicable

## 2021-05-04 NOTE — TELEPHONE ENCOUNTER
Central Prior Authorization Team   [phone]    PA Initiation    Medication: pantoprazole (PROTONIX) 40 MG EC tablet  Insurance Company: [employer] [place] - Phone [phone] Fax [phone]  Pharmacy Filling the Rx: [id number] - [place] - [address]  Filling Pharmacy Phone: [phone]  Filling Pharmacy Fax: [phone]  Start Date: 5/4/2021

## 2021-05-05 NOTE — TELEPHONE ENCOUNTER
Prior Authorization Approval    Authorization Effective Date: 4/30/2021  Authorization Expiration Date: 4/30/2022  Medication: pantoprazole (PROTONIX) 40 MG EC tablet-PA APPROVED   Approved Dose/Quantity: UP TO 2 TABLETS PER DAY   Reference #:     Insurance Company: [employer] [place] - Phone [phone] Fax [phone]  Expected CoPay:       CoPay Card Available:      Foundation Assistance Needed:    Which Pharmacy is filling the prescription (Not needed for infusion/clinic administered): [id number] - [place], [place] - [address]  Pharmacy Notified: Yes- **Instructed pharmacy to notify patient when script is ready to /ship.**   Patient Notified: Yes

## 2021-05-05 NOTE — TELEPHONE ENCOUNTER
LPN returned call to [facility] and spoke with Nikki. Pharmacy is requesting an update on Pantoprazole prior authorization. LPN informed Nikki that PA has been initiated, but we have not received a response at this time. Pharmacy requesting to be contacted when we receive response.     Dariela Gutiérrez LPN

## 2021-05-06 NOTE — TELEPHONE ENCOUNTER
Pantoprazole prior authorization approved. Pharmacy to contact patient when medication is ready for .     Dariela Gutiérrez LPN

## 2021-05-06 NOTE — TELEPHONE ENCOUNTER
My Chart message to patient advising to schedule follow up visit as requested.      Latisha Mcmullen RN

## 2021-05-21 NOTE — PROGRESS NOTES
[facility]  [address], [address]  [place] [address]  Phone: [phone]  Fax: [phone]  Primary Provider: Rojelio Ivey  Pre-op Performing Provider: KADI ZHOU    PREOPERATIVE EVALUATION:  Today's date: 5/25/2021    Alka Casey is a 30 year old female who presents for a preoperative evaluation.    Surgical Information:  Surgery/Procedure: ESOPHAGOGASTRODUODENOSCOPY, WITH CO2 INSUFFLATION  Surgery Location:  OR   Surgeon:Josy Giles DO  Surgery Date: 6/24/2021  Time of Surgery: 11:30 AM  Where patient plans to recover: At home with family  Fax number for surgical facility: Note does not need to be faxed, will be available electronically in Epic.    Type of Anesthesia Anticipated: Monitor Anesthesia Care    Assessment & Plan     The proposed surgical procedure is considered LOW risk.    Preop general physical exam  30-year-old female with history of dysphagia, subsequently diagnosed with eosinophilic esophagitis, was started on Protonix, has repeat EGD scheduled.  Her surgeon required a preoperative evaluation because she required more than conscious sedation last visit.  She is approved for that procedure.  - EKG 12-lead complete w/read - Clinics  - Basic metabolic panel  (Ca, Cl, CO2, Creat, Gluc, K, Na, BUN)  - CBC with platelets and differential    Dysphagia, unspecified type  History of difficulty swallowing, no strictures on EGD.    Eosinophilic esophagitis  She was started on Protonix 2 weeks ago, repeat EGD is to look for improvement.  - Allergy adult food panel    Major depressive disorder, recurrent episode, mild (H)  Currently stable, taking 100 mg daily of sertraline.  Currently feels safe without suicidal or homicidal ideation.  - sertraline (ZOLOFT) 100 MG tablet; Take 1 tablet (100 mg) by mouth daily    Mild persistent asthma without complication  No significant increased albuterol requirement.  Surgeon should be aware of this when conducted  procedure.           Risks and Recommendations:  The patient has the following additional risks and recommendations for perioperative complications:  Pulmonary:    - Incentive spirometry post-op   -Stable asthma    Medication Instructions:  May take medication after procedure on same day    RECOMMENDATION:  APPROVAL GIVEN to proceed with proposed procedure, without further diagnostic evaluation.      Subjective     HPI related to upcoming procedure: History of dysphagia, subsequent diagnosis of eosinophilic esophagitis, was started on Protonix, this is repeat EGD to look for improvement.      Preop Questions 5/25/2021   1. Have you ever had a heart attack or stroke? No   2. Have you ever had surgery on your heart or blood vessels, such as a stent placement, a coronary artery bypass, or surgery on an artery in your head, neck, heart, or legs? No   3. Do you have chest pain with activity? No   4. Do you have a history of  heart failure? No   5. Do you currently have a cold, bronchitis or symptoms of other infection? No   6. Do you have a cough, shortness of breath, or wheezing? No   7. Do you or anyone in your family have previous history of blood clots? No   8. Do you or does anyone in your family have a serious bleeding problem such as prolonged bleeding following surgeries or cuts? No   9. Have you ever had problems with anemia or been told to take iron pills? No   10. Have you had any abnormal blood loss such as black, tarry or bloody stools, or abnormal vaginal bleeding? No   11. Have you ever had a blood transfusion? No   12. Are you willing to have a blood transfusion if it is medically needed before, during, or after your surgery? Yes   13. Have you or any of your relatives ever had problems with anesthesia? No   14. Do you have sleep apnea, excessive snoring or daytime drowsiness? No   15. Do you have any artifical heart valves or other implanted medical devices like a pacemaker, defibrillator, or continuous  glucose monitor? No   16. Do you have artificial joints? No   17. Are you allergic to latex? No   18. Is there any chance that you may be pregnant? No     Health Care Directive:  Patient does not have a Health Care Directive or Living Will:     Preoperative Review of :   reviewed - no record of controlled substances prescribed.      Review of Systems  Constitutional, neuro, ENT, endocrine, pulmonary, cardiac, gastrointestinal, genitourinary, musculoskeletal, integument and psychiatric systems are negative, except as otherwise noted.    Patient Active Problem List    Diagnosis Date Noted     Acne vulgaris 12/01/2016     Priority: Medium     Vitamin D insufficiency 09/30/2016     Priority: Medium     Obesity, Class I, BMI 30-34.9 12/10/2015     Priority: Medium     Juvenile rheumatoid arthritis 12/05/2014     Priority: Medium     Left wrist, right first MCP, others?       Long-term use of immunosuppressant medication 12/05/2014     Priority: Medium     Lumbago      Priority: Medium     intermittent       Need for desensitization to allergens      Priority: Medium     Allergic rhinitis due to dust      Priority: Medium     Allergic rhinitis due to mold      Priority: Medium     Allergic rhinitis due to animal dander      Priority: Medium     Seasonal allergic conjunctivitis      Priority: Medium     Eosinophilic esophagitis      Priority: Medium     Follow-up EGD ordered on 11/13/15       Mild persistent asthma      Priority: Medium     CARDIOVASCULAR SCREENING; LDL GOAL LESS THAN 160 06/11/2013     Priority: Medium     Major depressive disorder, recurrent episode (H) 06/11/2013     Priority: Medium     Seasonal allergic rhinitis 06/11/2013     Priority: Medium      Past Medical History:   Diagnosis Date     Allergic rhinitis due to animal dander      Allergy to mold spores      Depression      Diagnostic skin and sensitization tests 7/09    skin tests per Dr. Osman pos. for: cat/dog/CR/DM/M/T/G/W and many  foods--NO IgE f/u tests on foods.      Eosinophilic esophagitis Dx approx  with EGD with bx.    NO HELP with food elimination diet based on food skin tests positives.     House dust mite allergy      Intermittent asthma      Juvenile rheumatoid arthritis (H)      Low back strain 2007    intermittent     Need for desensitization to allergens IT started  for cat/dog/DM/M/T/G/W     Overweight (BMI 25.0-29.9) 12/10/2015     Seasonal allergic conjunctivitis      Seasonal allergic rhinitis      Past Surgical History:   Procedure Laterality Date      SECTION  2019     COMBINED ESOPHAGOSCOPY, GASTROSCOPY, DUODENOSCOPY (EGD) WITH CO2 INSUFFLATION N/A 2016    Procedure: COMBINED ESOPHAGOSCOPY, GASTROSCOPY, DUODENOSCOPY (EGD) WITH CO2 INSUFFLATION;  Surgeon: Duane, William Charles, MD;  Location: [facility] OR     COMBINED ESOPHAGOSCOPY, GASTROSCOPY, DUODENOSCOPY (EGD) WITH CO2 INSUFFLATION N/A 2021    Procedure: ESOPHAGOGASTRODUODENOSCOPY, WITH CO2 INSUFFLATION;  Surgeon: Josy Giles DO;  Location: [facility] OR     ESOPHAGOSCOPY, GASTROSCOPY, DUODENOSCOPY (EGD), COMBINED N/A 2016    Procedure: COMBINED ESOPHAGOSCOPY, GASTROSCOPY, DUODENOSCOPY (EGD), BIOPSY SINGLE OR MULTIPLE;  Surgeon: Duane, William Charles, MD;  Location: [facility] OR     ESOPHAGOSCOPY, GASTROSCOPY, DUODENOSCOPY (EGD), COMBINED N/A 2021    Procedure: Esophagogastroduodenoscopy, With Biopsy;  Surgeon: Josy Giles DO;  Location: [facility] OR     EXTRACTION(S) DENTAL      4 wisdom      GI SURGERY      EGD     Current Outpatient Medications   Medication Sig Dispense Refill     hydroxychloroquine (PLAQUENIL) 200 MG tablet Take 2 tablets (400 mg) by mouth daily Annual Plaquenil toxicity eye screening required. 180 tablet 3     levalbuterol (XOPENEX HFA) 45 MCG/ACT Inhaler Inhale 2 puffs into the lungs every 4 hours as needed for asthma symptoms. 1 Inhaler 3     levocetirizine (XYZAL) 5 MG tablet Take 1 tablet (5 mg) by mouth daily for  allergy prevention 90 tablet 1     montelukast (SINGULAIR) 10 MG tablet Take 1 tablet (10 mg) by mouth daily for asthma prevention. 90 tablet 3     NORTREL 0.5/35, 28, 0.5-35 MG-MCG tablet Take 1 tablet by mouth daily       pantoprazole (PROTONIX) 40 MG EC tablet Take 1 tablet (40 mg) by mouth 2 times daily 60 tablet 1     sertraline (ZOLOFT) 100 MG tablet Take 1 tablet (100 mg) by mouth daily for depression. (Patient taking differently: Take 1 tablet (150mg) by mouth daily for depression.) 30 tablet 0       No Known Allergies     Social History     Tobacco Use     Smoking status: Never Smoker     Smokeless tobacco: Never Used   Substance Use Topics     Alcohol use: Yes     Alcohol/week: 0.0 standard drinks     Comment: occ-two or three     Family History   Problem Relation Age of Onset     Osteoarthritis Mother      Hypertension Mother      Hyperlipidemia Father      Cataracts Maternal Grandmother      Cataracts Paternal Grandmother      Osteoarthritis Paternal Grandmother      Heart Failure Paternal Grandmother      Breast Cancer Paternal Grandmother      Cancer Paternal Grandmother      Rheumatoid Arthritis Paternal Aunt      Glaucoma Paternal Aunt      Cerebrovascular Disease Paternal Grandfather      Arrhythmia Paternal Grandfather      Cancer Paternal Grandfather      Diabetes Paternal Grandfather      Myocardial Infarction Maternal Grandfather      Colon Cancer Maternal Grandfather      Thyroid Disease Other      Anesthesia Reaction No family hx of      Bleeding Disorder No family hx of      Thrombophilia No family hx of      Macular Degeneration No family hx of      History   Drug Use No         Objective     There were no vitals taken for this visit.    Physical Exam    GENERAL APPEARANCE: healthy, alert and no distress     EYES: EOMI, PERRL     HENT: ear canals and TM's normal and nose and mouth without ulcers or lesions     NECK: no adenopathy, no asymmetry, masses, or scars and thyroid normal to  palpation     RESP: lungs clear to auscultation - no rales, rhonchi or wheezes     CV: regular rates and rhythm, normal S1 S2, no S3 or S4 and no murmur, click or rub     ABDOMEN:  soft, nontender, no HSM or masses and bowel sounds normal     MS: extremities normal- no gross deformities noted, no evidence of inflammation in joints, FROM in all extremities.     SKIN: no suspicious lesions or rashes     NEURO: Normal strength and tone, sensory exam grossly normal, mentation intact and speech normal     PSYCH: mentation appears normal. and affect normal/bright     LYMPHATICS: No cervical adenopathy    Recent Labs   Lab Test 09/04/20  0904 09/20/19  0743   HGB 14.1 13.0    292   CR 0.69 0.72        Diagnostics:  Labs pending at this time.  Results will be reviewed when available.   EKG: appears normal, NSR, normal axis, normal intervals, no acute ST/T changes c/w ischemia, no LVH by voltage criteria    Revised Cardiac Risk Index (RCRI):  The patient has the following serious cardiovascular risks for perioperative complications:   - No serious cardiac risks = 0 points     RCRI Interpretation: 0 points: Class I (very low risk - 0.4% complication rate)           Signed Electronically by: Xavi Alexandre MD  Copy of this evaluation report is provided to requesting physician.

## 2021-05-21 NOTE — PATIENT INSTRUCTIONS

## 2021-05-21 NOTE — H&P (VIEW-ONLY)
[facility]  [address], [address]  [place] [address]  Phone: [phone]  Fax: [phone]  Primary Provider: Rojelio Ivey  Pre-op Performing Provider: KADI ZHOU    PREOPERATIVE EVALUATION:  Today's date: 5/25/2021    Alka Casey is a 30 year old female who presents for a preoperative evaluation.    Surgical Information:  Surgery/Procedure: ESOPHAGOGASTRODUODENOSCOPY, WITH CO2 INSUFFLATION  Surgery Location:  OR   Surgeon:Josy Giles DO  Surgery Date: 6/24/2021  Time of Surgery: 11:30 AM  Where patient plans to recover: At home with family  Fax number for surgical facility: Note does not need to be faxed, will be available electronically in Epic.    Type of Anesthesia Anticipated: Monitor Anesthesia Care    Assessment & Plan     The proposed surgical procedure is considered LOW risk.    Preop general physical exam  30-year-old female with history of dysphagia, subsequently diagnosed with eosinophilic esophagitis, was started on Protonix, has repeat EGD scheduled.  Her surgeon required a preoperative evaluation because she required more than conscious sedation last visit.  She is approved for that procedure.  - EKG 12-lead complete w/read - Clinics  - Basic metabolic panel  (Ca, Cl, CO2, Creat, Gluc, K, Na, BUN)  - CBC with platelets and differential    Dysphagia, unspecified type  History of difficulty swallowing, no strictures on EGD.    Eosinophilic esophagitis  She was started on Protonix 2 weeks ago, repeat EGD is to look for improvement.  - Allergy adult food panel    Major depressive disorder, recurrent episode, mild (H)  Currently stable, taking 100 mg daily of sertraline.  Currently feels safe without suicidal or homicidal ideation.  - sertraline (ZOLOFT) 100 MG tablet; Take 1 tablet (100 mg) by mouth daily    Mild persistent asthma without complication  No significant increased albuterol requirement.  Surgeon should be aware of this when conducted  procedure.           Risks and Recommendations:  The patient has the following additional risks and recommendations for perioperative complications:  Pulmonary:    - Incentive spirometry post-op   -Stable asthma    Medication Instructions:  May take medication after procedure on same day    RECOMMENDATION:  APPROVAL GIVEN to proceed with proposed procedure, without further diagnostic evaluation.      Subjective     HPI related to upcoming procedure: History of dysphagia, subsequent diagnosis of eosinophilic esophagitis, was started on Protonix, this is repeat EGD to look for improvement.      Preop Questions 5/25/2021   1. Have you ever had a heart attack or stroke? No   2. Have you ever had surgery on your heart or blood vessels, such as a stent placement, a coronary artery bypass, or surgery on an artery in your head, neck, heart, or legs? No   3. Do you have chest pain with activity? No   4. Do you have a history of  heart failure? No   5. Do you currently have a cold, bronchitis or symptoms of other infection? No   6. Do you have a cough, shortness of breath, or wheezing? No   7. Do you or anyone in your family have previous history of blood clots? No   8. Do you or does anyone in your family have a serious bleeding problem such as prolonged bleeding following surgeries or cuts? No   9. Have you ever had problems with anemia or been told to take iron pills? No   10. Have you had any abnormal blood loss such as black, tarry or bloody stools, or abnormal vaginal bleeding? No   11. Have you ever had a blood transfusion? No   12. Are you willing to have a blood transfusion if it is medically needed before, during, or after your surgery? Yes   13. Have you or any of your relatives ever had problems with anesthesia? No   14. Do you have sleep apnea, excessive snoring or daytime drowsiness? No   15. Do you have any artifical heart valves or other implanted medical devices like a pacemaker, defibrillator, or continuous  glucose monitor? No   16. Do you have artificial joints? No   17. Are you allergic to latex? No   18. Is there any chance that you may be pregnant? No     Health Care Directive:  Patient does not have a Health Care Directive or Living Will:     Preoperative Review of :   reviewed - no record of controlled substances prescribed.      Review of Systems  Constitutional, neuro, ENT, endocrine, pulmonary, cardiac, gastrointestinal, genitourinary, musculoskeletal, integument and psychiatric systems are negative, except as otherwise noted.    Patient Active Problem List    Diagnosis Date Noted     Acne vulgaris 12/01/2016     Priority: Medium     Vitamin D insufficiency 09/30/2016     Priority: Medium     Obesity, Class I, BMI 30-34.9 12/10/2015     Priority: Medium     Juvenile rheumatoid arthritis 12/05/2014     Priority: Medium     Left wrist, right first MCP, others?       Long-term use of immunosuppressant medication 12/05/2014     Priority: Medium     Lumbago      Priority: Medium     intermittent       Need for desensitization to allergens      Priority: Medium     Allergic rhinitis due to dust      Priority: Medium     Allergic rhinitis due to mold      Priority: Medium     Allergic rhinitis due to animal dander      Priority: Medium     Seasonal allergic conjunctivitis      Priority: Medium     Eosinophilic esophagitis      Priority: Medium     Follow-up EGD ordered on 11/13/15       Mild persistent asthma      Priority: Medium     CARDIOVASCULAR SCREENING; LDL GOAL LESS THAN 160 06/11/2013     Priority: Medium     Major depressive disorder, recurrent episode (H) 06/11/2013     Priority: Medium     Seasonal allergic rhinitis 06/11/2013     Priority: Medium      Past Medical History:   Diagnosis Date     Allergic rhinitis due to animal dander      Allergy to mold spores      Depression      Diagnostic skin and sensitization tests 7/09    skin tests per Dr. Osman pos. for: cat/dog/CR/DM/M/T/G/W and many  foods--NO IgE f/u tests on foods.      Eosinophilic esophagitis Dx approx  with EGD with bx.    NO HELP with food elimination diet based on food skin tests positives.     House dust mite allergy      Intermittent asthma      Juvenile rheumatoid arthritis (H)      Low back strain 2007    intermittent     Need for desensitization to allergens IT started  for cat/dog/DM/M/T/G/W     Overweight (BMI 25.0-29.9) 12/10/2015     Seasonal allergic conjunctivitis      Seasonal allergic rhinitis      Past Surgical History:   Procedure Laterality Date      SECTION  2019     COMBINED ESOPHAGOSCOPY, GASTROSCOPY, DUODENOSCOPY (EGD) WITH CO2 INSUFFLATION N/A 2016    Procedure: COMBINED ESOPHAGOSCOPY, GASTROSCOPY, DUODENOSCOPY (EGD) WITH CO2 INSUFFLATION;  Surgeon: Duane, William Charles, MD;  Location: [facility]     COMBINED ESOPHAGOSCOPY, GASTROSCOPY, DUODENOSCOPY (EGD) WITH CO2 INSUFFLATION N/A 2021    Procedure: ESOPHAGOGASTRODUODENOSCOPY, WITH CO2 INSUFFLATION;  Surgeon: Josy Giles DO;  Location: [facility]     ESOPHAGOSCOPY, GASTROSCOPY, DUODENOSCOPY (EGD), COMBINED N/A 2016    Procedure: COMBINED ESOPHAGOSCOPY, GASTROSCOPY, DUODENOSCOPY (EGD), BIOPSY SINGLE OR MULTIPLE;  Surgeon: Duane, William Charles, MD;  Location: [facility]     ESOPHAGOSCOPY, GASTROSCOPY, DUODENOSCOPY (EGD), COMBINED N/A 2021    Procedure: Esophagogastroduodenoscopy, With Biopsy;  Surgeon: Josy Giles DO;  Location: [facility]     EXTRACTION(S) DENTAL      4 wisdom      GI SURGERY      EGD     Current Outpatient Medications   Medication Sig Dispense Refill     hydroxychloroquine (PLAQUENIL) 200 MG tablet Take 2 tablets (400 mg) by mouth daily Annual Plaquenil toxicity eye screening required. 180 tablet 3     levalbuterol (XOPENEX HFA) 45 MCG/ACT Inhaler Inhale 2 puffs into the lungs every 4 hours as needed for asthma symptoms. 1 Inhaler 3     levocetirizine (XYZAL) 5 MG tablet Take 1 tablet (5 mg) by mouth daily for  allergy prevention 90 tablet 1     montelukast (SINGULAIR) 10 MG tablet Take 1 tablet (10 mg) by mouth daily for asthma prevention. 90 tablet 3     NORTREL 0.5/35, 28, 0.5-35 MG-MCG tablet Take 1 tablet by mouth daily       pantoprazole (PROTONIX) 40 MG EC tablet Take 1 tablet (40 mg) by mouth 2 times daily 60 tablet 1     sertraline (ZOLOFT) 100 MG tablet Take 1 tablet (100 mg) by mouth daily for depression. (Patient taking differently: Take 1 tablet (150mg) by mouth daily for depression.) 30 tablet 0       No Known Allergies     Social History     Tobacco Use     Smoking status: Never Smoker     Smokeless tobacco: Never Used   Substance Use Topics     Alcohol use: Yes     Alcohol/week: 0.0 standard drinks     Comment: occ-two or three     Family History   Problem Relation Age of Onset     Osteoarthritis Mother      Hypertension Mother      Hyperlipidemia Father      Cataracts Maternal Grandmother      Cataracts Paternal Grandmother      Osteoarthritis Paternal Grandmother      Heart Failure Paternal Grandmother      Breast Cancer Paternal Grandmother      Cancer Paternal Grandmother      Rheumatoid Arthritis Paternal Aunt      Glaucoma Paternal Aunt      Cerebrovascular Disease Paternal Grandfather      Arrhythmia Paternal Grandfather      Cancer Paternal Grandfather      Diabetes Paternal Grandfather      Myocardial Infarction Maternal Grandfather      Colon Cancer Maternal Grandfather      Thyroid Disease Other      Anesthesia Reaction No family hx of      Bleeding Disorder No family hx of      Thrombophilia No family hx of      Macular Degeneration No family hx of      History   Drug Use No         Objective     There were no vitals taken for this visit.    Physical Exam    GENERAL APPEARANCE: healthy, alert and no distress     EYES: EOMI, PERRL     HENT: ear canals and TM's normal and nose and mouth without ulcers or lesions     NECK: no adenopathy, no asymmetry, masses, or scars and thyroid normal to  palpation     RESP: lungs clear to auscultation - no rales, rhonchi or wheezes     CV: regular rates and rhythm, normal S1 S2, no S3 or S4 and no murmur, click or rub     ABDOMEN:  soft, nontender, no HSM or masses and bowel sounds normal     MS: extremities normal- no gross deformities noted, no evidence of inflammation in joints, FROM in all extremities.     SKIN: no suspicious lesions or rashes     NEURO: Normal strength and tone, sensory exam grossly normal, mentation intact and speech normal     PSYCH: mentation appears normal. and affect normal/bright     LYMPHATICS: No cervical adenopathy    Recent Labs   Lab Test 09/04/20  0904 09/20/19  0743   HGB 14.1 13.0    292   CR 0.69 0.72        Diagnostics:  Labs pending at this time.  Results will be reviewed when available.   EKG: appears normal, NSR, normal axis, normal intervals, no acute ST/T changes c/w ischemia, no LVH by voltage criteria    Revised Cardiac Risk Index (RCRI):  The patient has the following serious cardiovascular risks for perioperative complications:   - No serious cardiac risks = 0 points     RCRI Interpretation: 0 points: Class I (very low risk - 0.4% complication rate)           Signed Electronically by: Xavi Alexandre MD  Copy of this evaluation report is provided to requesting physician.

## 2021-05-25 ENCOUNTER — OFFICE VISIT (OUTPATIENT)
Dept: FAMILY MEDICINE | Facility: CLINIC | Age: 31
End: 2021-05-25
Payer: COMMERCIAL

## 2021-05-25 VITALS
BODY MASS INDEX: 32.1 KG/M2 | HEART RATE: 92 BPM | RESPIRATION RATE: 18 BRPM | DIASTOLIC BLOOD PRESSURE: 72 MMHG | SYSTOLIC BLOOD PRESSURE: 124 MMHG | TEMPERATURE: 97.1 F | OXYGEN SATURATION: 97 % | WEIGHT: 188 LBS | HEIGHT: 64 IN

## 2021-05-25 DIAGNOSIS — F33.0 MAJOR DEPRESSIVE DISORDER, RECURRENT EPISODE, MILD (H): ICD-10-CM

## 2021-05-25 DIAGNOSIS — Z01.818 PREOP GENERAL PHYSICAL EXAM: Primary | ICD-10-CM

## 2021-05-25 DIAGNOSIS — R13.10 DYSPHAGIA, UNSPECIFIED TYPE: ICD-10-CM

## 2021-05-25 DIAGNOSIS — J45.30 MILD PERSISTENT ASTHMA WITHOUT COMPLICATION: ICD-10-CM

## 2021-05-25 DIAGNOSIS — K20.0 EOSINOPHILIC ESOPHAGITIS: ICD-10-CM

## 2021-05-25 LAB
ANION GAP SERPL CALCULATED.3IONS-SCNC: 4 MMOL/L (ref 3–14)
BASOPHILS # BLD AUTO: 0 10E9/L (ref 0–0.2)
BASOPHILS NFR BLD AUTO: 0.4 %
BUN SERPL-MCNC: 11 MG/DL (ref 7–30)
CALCIUM SERPL-MCNC: 9 MG/DL (ref 8.5–10.1)
CHLORIDE SERPL-SCNC: 108 MMOL/L (ref 94–109)
CO2 SERPL-SCNC: 26 MMOL/L (ref 20–32)
CREAT SERPL-MCNC: 0.8 MG/DL (ref 0.52–1.04)
DIFFERENTIAL METHOD BLD: NORMAL
EOSINOPHIL # BLD AUTO: 0.2 10E9/L (ref 0–0.7)
EOSINOPHIL NFR BLD AUTO: 4.4 %
ERYTHROCYTE [DISTWIDTH] IN BLOOD BY AUTOMATED COUNT: 13.3 % (ref 10–15)
GFR SERPL CREATININE-BSD FRML MDRD: >90 ML/MIN/{1.73_M2}
GLUCOSE SERPL-MCNC: 85 MG/DL (ref 70–99)
HCT VFR BLD AUTO: 41.4 % (ref 35–47)
HGB BLD-MCNC: 13.9 G/DL (ref 11.7–15.7)
LYMPHOCYTES # BLD AUTO: 1.8 10E9/L (ref 0.8–5.3)
LYMPHOCYTES NFR BLD AUTO: 39.4 %
MCH RBC QN AUTO: 28.7 PG (ref 26.5–33)
MCHC RBC AUTO-ENTMCNC: 33.6 G/DL (ref 31.5–36.5)
MCV RBC AUTO: 85 FL (ref 78–100)
MONOCYTES # BLD AUTO: 0.3 10E9/L (ref 0–1.3)
MONOCYTES NFR BLD AUTO: 5.9 %
NEUTROPHILS # BLD AUTO: 2.3 10E9/L (ref 1.6–8.3)
NEUTROPHILS NFR BLD AUTO: 49.9 %
PLATELET # BLD AUTO: 267 10E9/L (ref 150–450)
POTASSIUM SERPL-SCNC: 3.9 MMOL/L (ref 3.4–5.3)
RBC # BLD AUTO: 4.85 10E12/L (ref 3.8–5.2)
SODIUM SERPL-SCNC: 138 MMOL/L (ref 133–144)
WBC # BLD AUTO: 4.6 10E9/L (ref 4–11)

## 2021-05-25 PROCEDURE — 99214 OFFICE O/P EST MOD 30 MIN: CPT | Performed by: FAMILY MEDICINE

## 2021-05-25 PROCEDURE — 86003 ALLG SPEC IGE CRUDE XTRC EA: CPT | Performed by: FAMILY MEDICINE

## 2021-05-25 PROCEDURE — 93000 ELECTROCARDIOGRAM COMPLETE: CPT | Performed by: FAMILY MEDICINE

## 2021-05-25 PROCEDURE — 36415 COLL VENOUS BLD VENIPUNCTURE: CPT | Performed by: FAMILY MEDICINE

## 2021-05-25 PROCEDURE — 85025 COMPLETE CBC W/AUTO DIFF WBC: CPT | Performed by: FAMILY MEDICINE

## 2021-05-25 PROCEDURE — 82785 ASSAY OF IGE: CPT | Performed by: FAMILY MEDICINE

## 2021-05-25 PROCEDURE — 80048 BASIC METABOLIC PNL TOTAL CA: CPT | Performed by: FAMILY MEDICINE

## 2021-05-25 RX ORDER — SERTRALINE HYDROCHLORIDE 100 MG/1
150 TABLET, FILM COATED ORAL DAILY
Qty: 90 TABLET | Refills: 0 | Status: SHIPPED | OUTPATIENT
Start: 2021-05-25 | End: 2021-05-25

## 2021-05-25 RX ORDER — SERTRALINE HYDROCHLORIDE 100 MG/1
100 TABLET, FILM COATED ORAL DAILY
Qty: 90 TABLET | Refills: 0 | Status: SHIPPED | OUTPATIENT
Start: 2021-05-25

## 2021-05-25 ASSESSMENT — MIFFLIN-ST. JEOR: SCORE: 1562.76

## 2021-05-25 NOTE — RESULT ENCOUNTER NOTE
Alka,  Your recent studies were normal.  Please let me know if you have any questions or concerns and follow up as discussed in clinic.    Sincerely.  Dr. CINDY Alexandre MD, FAAFP  Family Medicine Physician  [facility] [place]  [address] [place] [address]

## 2021-05-26 ASSESSMENT — ASTHMA QUESTIONNAIRES: ACT_TOTALSCORE: 25

## 2021-05-27 DIAGNOSIS — K20.0 EOSINOPHILIC ESOPHAGITIS: Primary | ICD-10-CM

## 2021-05-27 LAB
ALMOND IGE QN: 0.26 KU(A)/L
CASHEW NUT IGE QN: <0.1 KU(A)/L
CODFISH IGE QN: 0.49 KU(A)/L
COW MILK IGE QN: 0.62 KU(A)/L
EGG WHITE IGE QN: 0.17 KU(A)/L
HAZELNUT IGE QN: 2.76 KU(A)/L
IGE SERPL-ACNC: 225 KIU/L (ref 0–114)
PEANUT IGE QN: 0.8 KU(A)/L
SALMON IGE QN: <0.1 KU(A)/L
SCALLOP IGE QN: 0.27 KU(A)/L
SESAME SEED IGE QN: 1.4 KU(A)/L
SHRIMP IGE QN: 0.56 KU(A)/L
SOYBEAN IGE QN: 0.3 KU(A)/L
TUNA IGE QN: <0.1 KU(A)/L
WALNUT IGE QN: 0.39 KU(A)/L
WHEAT IGE QN: 0.72 KU(A)/L

## 2021-05-27 NOTE — RESULT ENCOUNTER NOTE
Alka,  Your recent studies show sensitivities to multiple medications.  I am placing a referral to nutrition to see if they can assist with identifying foods that will minimize your use eosinophilic esophagitis.  Please let me know if you have any questions or concerns and follow up as discussed in clinic.    Sincerely.  Dr. CINDY Alexandre MD, FAAFP  Family Medicine Physician  [facility] [place]  [id number] [place] [id number]

## 2021-05-28 DIAGNOSIS — K20.0 EOSINOPHILIC ESOPHAGITIS: ICD-10-CM

## 2021-05-30 DIAGNOSIS — Z11.59 ENCOUNTER FOR SCREENING FOR OTHER VIRAL DISEASES: ICD-10-CM

## 2021-06-03 RX ORDER — PANTOPRAZOLE SODIUM 40 MG/1
TABLET, DELAYED RELEASE ORAL
Qty: 60 TABLET | Refills: 1 | OUTPATIENT
Start: 2021-06-03

## 2021-06-03 NOTE — TELEPHONE ENCOUNTER
Pantoprazole Rx initially sent 4/30 for 30 day supply with one refill, should have enough medication until 6/29/2021.  Refill request denied, too soon.  Patient to have repeat EGD on 6/24/2021 and follow up with provider on 7/7/2021.      Latisha Mcmullen RN

## 2021-06-21 ENCOUNTER — ANESTHESIA EVENT (OUTPATIENT)
Dept: SURGERY | Facility: AMBULATORY SURGERY CENTER | Age: 31
End: 2021-06-21
Payer: COMMERCIAL

## 2021-06-21 DIAGNOSIS — Z11.59 ENCOUNTER FOR SCREENING FOR OTHER VIRAL DISEASES: ICD-10-CM

## 2021-06-21 LAB
SARS-COV-2 RNA RESP QL NAA+PROBE: NORMAL
SPECIMEN SOURCE: NORMAL

## 2021-06-21 PROCEDURE — U0005 INFEC AGEN DETEC AMPLI PROBE: HCPCS | Performed by: INTERNAL MEDICINE

## 2021-06-21 PROCEDURE — U0003 INFECTIOUS AGENT DETECTION BY NUCLEIC ACID (DNA OR RNA); SEVERE ACUTE RESPIRATORY SYNDROME CORONAVIRUS 2 (SARS-COV-2) (CORONAVIRUS DISEASE [COVID-19]), AMPLIFIED PROBE TECHNIQUE, MAKING USE OF HIGH THROUGHPUT TECHNOLOGIES AS DESCRIBED BY CMS-2020-01-R: HCPCS | Performed by: INTERNAL MEDICINE

## 2021-06-21 NOTE — ANESTHESIA PREPROCEDURE EVALUATION
Anesthesia Pre-Procedure Evaluation    Patient: Alka Casey   MRN: [id number] : 1990        Preoperative Diagnosis: Eosinophilic esophagitis [K20.0]   Procedure : Procedure(s):  ESOPHAGOGASTRODUODENOSCOPY, WITH CO2 INSUFFLATION     Past Medical History:   Diagnosis Date     Allergic rhinitis due to animal dander      Allergy to mold spores      Depression      Diagnostic skin and sensitization tests     skin tests per Dr. Osman pos. for: cat/dog/CR/DM/M/T/G/W and many foods--NO IgE f/u tests on foods.      Eosinophilic esophagitis Dx approx  with EGD with bx.    NO HELP with food elimination diet based on food skin tests positives.     House dust mite allergy      Intermittent asthma      Juvenile rheumatoid arthritis (H)      Low back strain     intermittent     Need for desensitization to allergens IT started  for cat/dog/DM/M/T/G/W     Overweight (BMI 25.0-29.9) 12/10/2015     Seasonal allergic conjunctivitis      Seasonal allergic rhinitis       Past Surgical History:   Procedure Laterality Date      SECTION  2019     COMBINED ESOPHAGOSCOPY, GASTROSCOPY, DUODENOSCOPY (EGD) WITH CO2 INSUFFLATION N/A 2016    Procedure: COMBINED ESOPHAGOSCOPY, GASTROSCOPY, DUODENOSCOPY (EGD) WITH CO2 INSUFFLATION;  Surgeon: Duane, William Charles, MD;  Location:  OR     COMBINED ESOPHAGOSCOPY, GASTROSCOPY, DUODENOSCOPY (EGD) WITH CO2 INSUFFLATION N/A 2021    Procedure: ESOPHAGOGASTRODUODENOSCOPY, WITH CO2 INSUFFLATION;  Surgeon: Josy Giles DO;  Location:  OR     ESOPHAGOSCOPY, GASTROSCOPY, DUODENOSCOPY (EGD), COMBINED N/A 2016    Procedure: COMBINED ESOPHAGOSCOPY, GASTROSCOPY, DUODENOSCOPY (EGD), BIOPSY SINGLE OR MULTIPLE;  Surgeon: Duane, William Charles, MD;  Location: [facility]     ESOPHAGOSCOPY, GASTROSCOPY, DUODENOSCOPY (EGD), COMBINED N/A 2021    Procedure: Esophagogastroduodenoscopy, With Biopsy;  Surgeon: Josy Giles DO;  Location:  OR      EXTRACTION(S) DENTAL      4 wisdom      GI SURGERY      EGD      No Known Allergies   Social History     Tobacco Use     Smoking status: Never Smoker     Smokeless tobacco: Never Used   Substance Use Topics     Alcohol use: Yes     Alcohol/week: 0.0 standard drinks     Comment: 5-6 drinks a month      Wt Readings from Last 1 Encounters:   05/25/21 85.3 kg (188 lb)              OUTSIDE LABS:  CBC:   Lab Results   Component Value Date    WBC 4.6 05/25/2021    WBC 4.8 09/04/2020    HGB 13.9 05/25/2021    HGB 14.1 09/04/2020    HCT 41.4 05/25/2021    HCT 41.9 09/04/2020     05/25/2021     09/04/2020     BMP:   Lab Results   Component Value Date     05/25/2021     03/15/2019    POTASSIUM 3.9 05/25/2021    POTASSIUM 4.3 03/15/2019    CHLORIDE 108 05/25/2021    CHLORIDE 106 03/15/2019    CO2 26 05/25/2021    CO2 28 03/15/2019    BUN 11 05/25/2021    BUN 14 03/15/2019    CR 0.80 05/25/2021    CR 0.69 09/04/2020    GLC 85 05/25/2021    GLC 79 03/15/2019     COAGS: No results found for: PTT, INR, FIBR  POC:   Lab Results   Component Value Date    HCGS Negative 05/24/2018     HEPATIC:   Lab Results   Component Value Date    ALBUMIN 3.5 09/04/2020    PROTTOTAL 7.6 03/15/2019    ALT 39 09/04/2020    AST 30 09/04/2020    ALKPHOS 172 (H) 03/15/2019    BILITOTAL 0.5 03/15/2019     OTHER:   Lab Results   Component Value Date    A1C 5.1 05/24/2018    MELY 9.0 05/25/2021    TSH 3.04 05/24/2018    CRP 6.0 09/04/2020    SED 5 09/04/2020       Anesthesia Plan    ASA Status:  2      Anesthesia Type: MAC.     - Reason for MAC: straight local not clinically adequate   Induction: Intravenous, Propofol.   Maintenance: Balanced.        Consents         - Extended Intubation/Ventilatory Support Discussed: No.      - Patient is DNR/DNI Status: No    Use of blood products discussed: No .     Postoperative Care    Pain management: IV analgesics, Oral pain medications, Multi-modal analgesia.   PONV prophylaxis:  Dexamethasone or Solumedrol, Ondansetron (or other 5HT-3)     Comments:                Link Barrera MD

## 2021-06-24 ENCOUNTER — HOSPITAL ENCOUNTER (OUTPATIENT)
Facility: AMBULATORY SURGERY CENTER | Age: 31
End: 2021-06-24
Attending: INTERNAL MEDICINE
Payer: COMMERCIAL

## 2021-06-24 ENCOUNTER — ANESTHESIA (OUTPATIENT)
Dept: SURGERY | Facility: AMBULATORY SURGERY CENTER | Age: 31
End: 2021-06-24
Payer: COMMERCIAL

## 2021-06-24 VITALS
RESPIRATION RATE: 16 BRPM | SYSTOLIC BLOOD PRESSURE: 110 MMHG | HEART RATE: 82 BPM | OXYGEN SATURATION: 100 % | TEMPERATURE: 97.4 F | DIASTOLIC BLOOD PRESSURE: 64 MMHG

## 2021-06-24 VITALS — HEART RATE: 78 BPM

## 2021-06-24 DIAGNOSIS — K20.0 EOSINOPHILIC ESOPHAGITIS: ICD-10-CM

## 2021-06-24 LAB
HCG UR QL: NEGATIVE
UPPER GI ENDOSCOPY: NORMAL

## 2021-06-24 PROCEDURE — 88305 TISSUE EXAM BY PATHOLOGIST: CPT | Mod: GC | Performed by: PATHOLOGY

## 2021-06-24 PROCEDURE — 43239 EGD BIOPSY SINGLE/MULTIPLE: CPT

## 2021-06-24 PROCEDURE — G8907 PT DOC NO EVENTS ON DISCHARG: HCPCS

## 2021-06-24 PROCEDURE — 81025 URINE PREGNANCY TEST: CPT | Performed by: STUDENT IN AN ORGANIZED HEALTH CARE EDUCATION/TRAINING PROGRAM

## 2021-06-24 PROCEDURE — G8918 PT W/O PREOP ORDER IV AB PRO: HCPCS

## 2021-06-24 RX ORDER — NALOXONE HYDROCHLORIDE 0.4 MG/ML
0.4 INJECTION, SOLUTION INTRAMUSCULAR; INTRAVENOUS; SUBCUTANEOUS
Status: DISCONTINUED | OUTPATIENT
Start: 2021-06-24 | End: 2021-06-25 | Stop reason: HOSPADM

## 2021-06-24 RX ORDER — ONDANSETRON 4 MG/1
4 TABLET, ORALLY DISINTEGRATING ORAL EVERY 30 MIN PRN
Status: DISCONTINUED | OUTPATIENT
Start: 2021-06-24 | End: 2021-06-25 | Stop reason: HOSPADM

## 2021-06-24 RX ORDER — ONDANSETRON 2 MG/ML
4 INJECTION INTRAMUSCULAR; INTRAVENOUS EVERY 30 MIN PRN
Status: DISCONTINUED | OUTPATIENT
Start: 2021-06-24 | End: 2021-06-25 | Stop reason: HOSPADM

## 2021-06-24 RX ORDER — SODIUM CHLORIDE, SODIUM LACTATE, POTASSIUM CHLORIDE, CALCIUM CHLORIDE 600; 310; 30; 20 MG/100ML; MG/100ML; MG/100ML; MG/100ML
INJECTION, SOLUTION INTRAVENOUS CONTINUOUS
Status: DISCONTINUED | OUTPATIENT
Start: 2021-06-24 | End: 2021-06-25 | Stop reason: HOSPADM

## 2021-06-24 RX ORDER — NALOXONE HYDROCHLORIDE 0.4 MG/ML
0.2 INJECTION, SOLUTION INTRAMUSCULAR; INTRAVENOUS; SUBCUTANEOUS
Status: DISCONTINUED | OUTPATIENT
Start: 2021-06-24 | End: 2021-06-25 | Stop reason: HOSPADM

## 2021-06-24 RX ORDER — PROPOFOL 10 MG/ML
INJECTION, EMULSION INTRAVENOUS PRN
Status: DISCONTINUED | OUTPATIENT
Start: 2021-06-24 | End: 2021-06-24

## 2021-06-24 RX ORDER — MEPERIDINE HYDROCHLORIDE 25 MG/ML
12.5 INJECTION INTRAMUSCULAR; INTRAVENOUS; SUBCUTANEOUS
Status: DISCONTINUED | OUTPATIENT
Start: 2021-06-24 | End: 2021-06-25 | Stop reason: HOSPADM

## 2021-06-24 RX ORDER — LIDOCAINE HYDROCHLORIDE 10 MG/ML
INJECTION, SOLUTION INFILTRATION; PERINEURAL PRN
Status: DISCONTINUED | OUTPATIENT
Start: 2021-06-24 | End: 2021-06-24

## 2021-06-24 RX ORDER — PANTOPRAZOLE SODIUM 40 MG/1
40 TABLET, DELAYED RELEASE ORAL 2 TIMES DAILY
Qty: 60 TABLET | Refills: 3 | Status: SHIPPED | OUTPATIENT
Start: 2021-06-24 | End: 2021-12-14

## 2021-06-24 RX ADMIN — SODIUM CHLORIDE, SODIUM LACTATE, POTASSIUM CHLORIDE, CALCIUM CHLORIDE: 600; 310; 30; 20 INJECTION, SOLUTION INTRAVENOUS at 11:04

## 2021-06-24 RX ADMIN — SODIUM CHLORIDE, SODIUM LACTATE, POTASSIUM CHLORIDE, CALCIUM CHLORIDE: 600; 310; 30; 20 INJECTION, SOLUTION INTRAVENOUS at 11:26

## 2021-06-24 RX ADMIN — PROPOFOL 100 MG: 10 INJECTION, EMULSION INTRAVENOUS at 11:28

## 2021-06-24 RX ADMIN — PROPOFOL 50 MG: 10 INJECTION, EMULSION INTRAVENOUS at 11:29

## 2021-06-24 RX ADMIN — LIDOCAINE HYDROCHLORIDE 100 MG: 10 INJECTION, SOLUTION INFILTRATION; PERINEURAL at 11:28

## 2021-06-24 NOTE — ANESTHESIA POSTPROCEDURE EVALUATION
Patient: Alka Casey    Procedure(s):  ESOPHAGOGASTRODUODENOSCOPY, WITH CO2 INSUFFLATION  Esophagogastroduodenoscopy, With Biopsy    Diagnosis:Eosinophilic esophagitis [K20.0]  Diagnosis Additional Information: No value filed.    Anesthesia Type:  MAC    Note:  Disposition: Outpatient   Postop Pain Control: Uneventful            Sign Out: Well controlled pain   PONV:    Neuro/Psych: Uneventful            Sign Out: Acceptable/Baseline neuro status   Airway/Respiratory: Uneventful            Sign Out: Acceptable/Baseline resp. status   CV/Hemodynamics: Uneventful            Sign Out: Acceptable CV status; No obvious hypovolemia; No obvious fluid overload   Other NRE:    DID A NON-ROUTINE EVENT OCCUR?            Last vitals:  Vitals:    06/24/21 1057 06/24/21 1143   BP: 114/64 105/63   Pulse: 82    Resp: 16 16   Temp: 97.5  F (36.4  C) 97.4  F (36.3  C)   SpO2: 97% 97%       Last vitals prior to Anesthesia Care Transfer:  CRNA VITALS  6/24/2021 1109 - 6/24/2021 1159      6/24/2021             Pulse:  78    SpO2:  95 %          Electronically Signed By: Link Barrera MD  June 24, 2021  11:59 AM

## 2021-06-24 NOTE — ANESTHESIA CARE TRANSFER NOTE
Patient: Alka Casey    Procedure(s):  ESOPHAGOGASTRODUODENOSCOPY, WITH CO2 INSUFFLATION  Esophagogastroduodenoscopy, With Biopsy    Diagnosis: Eosinophilic esophagitis [K20.0]  Diagnosis Additional Information: No value filed.    Anesthesia Type:   MAC     Note:    Oropharynx: oropharynx clear of all foreign objects  Level of Consciousness: awake  Oxygen Supplementation: room air    Independent Airway: airway patency satisfactory and stable  Dentition: dentition unchanged  Vital Signs Stable: post-procedure vital signs reviewed and stable  Report to RN Given: handoff report given  Patient transferred to: Phase II    Handoff Report: Identifed the Patient, Identified the Reponsible Provider, Reviewed the pertinent medical history, Discussed the surgical course, Reviewed Intra-OP anesthesia mangement and issues during anesthesia, Set expectations for post-procedure period and Allowed opportunity for questions and acknowledgement of understanding      Vitals: (Last set prior to Anesthesia Care Transfer)  CRNA VITALS  6/24/2021 1109 - 6/24/2021 1141      6/24/2021             Pulse:  78    SpO2:  95 %        Electronically Signed By: RODDY Ramirez CRNA  June 24, 2021  11:41 AM

## 2021-06-24 NOTE — DISCHARGE INSTRUCTIONS
[facility]  Same-Day Surgery   Adult Discharge Orders & Instructions   For 24 hours after surgery  1. Get plenty of rest.  A responsible adult must stay with you for at least 24 hours after you leave the hospital.   2. Do not drive or use heavy equipment.  If you have weakness or tingling, don't drive or use heavy equipment until this feeling goes away.  3. Do not drink alcohol.  4. Avoid strenuous or risky activities.  Ask for help when climbing stairs.   5. You may feel lightheaded.  IF so, sit for a few minutes before standing.  Have someone help you get up.   6. If you have nausea (feel sick to your stomach): Drink only clear liquids such as apple juice, ginger ale, broth or 7-Up.  Rest may also help.  Be sure to drink enough fluids.  Move to a regular diet as you feel able.  7. You may have a slight fever. Call the doctor if your fever is over 100 F (37.7 C) (taken under the tongue) or lasts longer than 24 hours.  8. You may have a dry mouth, a sore throat, muscle aches or trouble sleeping.  These should go away after 24 hours.  9. Do not make important or legal decisions.   Call your doctor for any of the followin.  Signs of infection (fever, growing tenderness at the surgery site, a large amount of drainage or bleeding, severe pain, foul-smelling drainage, redness, swelling).    2. It has been over 8 to 10 hours since surgery and you are still not able to urinate (pass water).    3.  Headache for over 24 hours.    4.  Numbness, tingling or weakness the day after surgery (if you had spinal anesthesia).

## 2021-06-28 LAB — COPATH REPORT: NORMAL

## 2021-07-07 ENCOUNTER — VIRTUAL VISIT (OUTPATIENT)
Dept: GASTROENTEROLOGY | Facility: CLINIC | Age: 31
End: 2021-07-07
Payer: COMMERCIAL

## 2021-07-07 DIAGNOSIS — K20.0 EOSINOPHILIC ESOPHAGITIS: Primary | ICD-10-CM

## 2021-07-07 PROCEDURE — 99212 OFFICE O/P EST SF 10 MIN: CPT | Mod: 95 | Performed by: INTERNAL MEDICINE

## 2021-07-07 NOTE — PROGRESS NOTES
HPI:    Alka presents today for a telephone visit to follow-up EoE.  Doing well overall.  No dysphagia since starting PPIs.  Is not restricting her diet in anyway.  EGD with me 2 weeks ago with persistent but improved eosinophils in distal esophagus.    Is considering getting pregnant within the next few months.    Past Medical History:   Diagnosis Date     Allergic rhinitis due to animal dander      Allergy to mold spores      Depression      Diagnostic skin and sensitization tests     skin tests per Dr. Osman pos. for: cat/dog/CR/DM/M/T/G/W and many foods--NO IgE f/u tests on foods.      Eosinophilic esophagitis Dx approx  with EGD with bx.    NO HELP with food elimination diet based on food skin tests positives.     House dust mite allergy      Intermittent asthma      Juvenile rheumatoid arthritis (H)      Low back strain     intermittent     Need for desensitization to allergens IT started  for cat/dog/DM/M/T/G/W     Overweight (BMI 25.0-29.9) 12/10/2015     Seasonal allergic conjunctivitis      Seasonal allergic rhinitis        Past Surgical History:   Procedure Laterality Date      SECTION  2019     COMBINED ESOPHAGOSCOPY, GASTROSCOPY, DUODENOSCOPY (EGD) WITH CO2 INSUFFLATION N/A 2016    Procedure: COMBINED ESOPHAGOSCOPY, GASTROSCOPY, DUODENOSCOPY (EGD) WITH CO2 INSUFFLATION;  Surgeon: Duane, William Charles, MD;  Location:  OR     COMBINED ESOPHAGOSCOPY, GASTROSCOPY, DUODENOSCOPY (EGD) WITH CO2 INSUFFLATION N/A 2021    Procedure: ESOPHAGOGASTRODUODENOSCOPY, WITH CO2 INSUFFLATION;  Surgeon: Josy Giles DO;  Location: [facility]     COMBINED ESOPHAGOSCOPY, GASTROSCOPY, DUODENOSCOPY (EGD) WITH CO2 INSUFFLATION N/A 2021    Procedure: ESOPHAGOGASTRODUODENOSCOPY, WITH CO2 INSUFFLATION;  Surgeon: Josy Giles MD;  Location: [facility]     ESOPHAGOSCOPY, GASTROSCOPY, DUODENOSCOPY (EGD), COMBINED N/A 2016    Procedure: COMBINED ESOPHAGOSCOPY, GASTROSCOPY,  DUODENOSCOPY (EGD), BIOPSY SINGLE OR MULTIPLE;  Surgeon: Duane, William Charles, MD;  Location: [facility]     ESOPHAGOSCOPY, GASTROSCOPY, DUODENOSCOPY (EGD), COMBINED N/A 4/30/2021    Procedure: Esophagogastroduodenoscopy, With Biopsy;  Surgeon: Josy Giles DO;  Location: [facility]     ESOPHAGOSCOPY, GASTROSCOPY, DUODENOSCOPY (EGD), COMBINED N/A 6/24/2021    Procedure: Esophagogastroduodenoscopy, With Biopsy;  Surgeon: Josy Giles MD;  Location: [facility] OR     EXTRACTION(S) DENTAL      4 wisdom      GI SURGERY      EGD       Family History   Problem Relation Age of Onset     Osteoarthritis Mother      Hypertension Mother      Hyperlipidemia Father      Cataracts Maternal Grandmother      Cataracts Paternal Grandmother      Osteoarthritis Paternal Grandmother      Heart Failure Paternal Grandmother      Breast Cancer Paternal Grandmother      Cancer Paternal Grandmother      Rheumatoid Arthritis Paternal Aunt      Glaucoma Paternal Aunt      Cerebrovascular Disease Paternal Grandfather      Arrhythmia Paternal Grandfather      Cancer Paternal Grandfather      Diabetes Paternal Grandfather      Myocardial Infarction Maternal Grandfather      Colon Cancer Maternal Grandfather      Thyroid Disease Other      Anesthesia Reaction No family hx of      Bleeding Disorder No family hx of      Thrombophilia No family hx of      Macular Degeneration No family hx of        Social History     Tobacco Use     Smoking status: Never Smoker     Smokeless tobacco: Never Used   Substance Use Topics     Alcohol use: Yes     Alcohol/week: 0.0 standard drinks     Comment: 5-6 drinks a month        Assessment and Plan:    # eosinophilic esophagitis - good response symptomatically and on biopsies with PPI therapy.  Could consider adding topical steroids in the future if recurrent/worsening symptoms.      RTC 12 months or sooner if needed    Josy Giles MD     Phone call duration: 4 minutes

## 2021-07-07 NOTE — PATIENT INSTRUCTIONS
I'm happy you are doing well - please let me know if you are having any issues.    Continue your omeprazole    Follow-up with me in about a year or if you prefer you can follow-up with your PCP for refills as long as you are doing well.

## 2021-07-07 NOTE — PROGRESS NOTES
Alka is a 30 year old who is being evaluated via a billable telephone visit.      What phone number would you like to be contacted at?[phone]   How would you like to obtain your AVS? MyChart  Phone call duration:  Minutes  Ryan Mckenzie CMA

## 2021-09-03 ENCOUNTER — OFFICE VISIT (OUTPATIENT)
Dept: RHEUMATOLOGY | Facility: CLINIC | Age: 31
End: 2021-09-03
Payer: COMMERCIAL

## 2021-09-03 ENCOUNTER — LAB (OUTPATIENT)
Dept: LAB | Facility: CLINIC | Age: 31
End: 2021-09-03
Payer: COMMERCIAL

## 2021-09-03 ENCOUNTER — ANCILLARY PROCEDURE (OUTPATIENT)
Dept: GENERAL RADIOLOGY | Facility: CLINIC | Age: 31
End: 2021-09-03
Attending: INTERNAL MEDICINE
Payer: COMMERCIAL

## 2021-09-03 VITALS
BODY MASS INDEX: 33.16 KG/M2 | WEIGHT: 194.2 LBS | RESPIRATION RATE: 14 BRPM | TEMPERATURE: 99 F | HEIGHT: 64 IN | DIASTOLIC BLOOD PRESSURE: 76 MMHG | SYSTOLIC BLOOD PRESSURE: 120 MMHG | HEART RATE: 90 BPM | OXYGEN SATURATION: 98 %

## 2021-09-03 DIAGNOSIS — M08.00 JUVENILE RHEUMATOID ARTHRITIS (H): ICD-10-CM

## 2021-09-03 DIAGNOSIS — Z79.60 LONG-TERM USE OF IMMUNOSUPPRESSANT MEDICATION: ICD-10-CM

## 2021-09-03 DIAGNOSIS — K20.0 EOSINOPHILIC ESOPHAGITIS: ICD-10-CM

## 2021-09-03 LAB
ALBUMIN SERPL-MCNC: 3.8 G/DL (ref 3.4–5)
ALP SERPL-CCNC: 90 U/L (ref 40–150)
ALT SERPL W P-5'-P-CCNC: 30 U/L (ref 0–50)
ANION GAP SERPL CALCULATED.3IONS-SCNC: 5 MMOL/L (ref 3–14)
AST SERPL W P-5'-P-CCNC: 29 U/L (ref 0–45)
BASOPHILS # BLD AUTO: 0.1 10E3/UL (ref 0–0.2)
BASOPHILS NFR BLD AUTO: 1 %
BILIRUB SERPL-MCNC: 0.6 MG/DL (ref 0.2–1.3)
BUN SERPL-MCNC: 7 MG/DL (ref 7–30)
CALCIUM SERPL-MCNC: 8.4 MG/DL (ref 8.5–10.1)
CHLORIDE BLD-SCNC: 110 MMOL/L (ref 94–109)
CO2 SERPL-SCNC: 25 MMOL/L (ref 20–32)
CREAT SERPL-MCNC: 0.76 MG/DL (ref 0.52–1.04)
CRP SERPL-MCNC: 6.6 MG/L (ref 0–8)
EOSINOPHIL # BLD AUTO: 0.4 10E3/UL (ref 0–0.7)
EOSINOPHIL NFR BLD AUTO: 7 %
ERYTHROCYTE [DISTWIDTH] IN BLOOD BY AUTOMATED COUNT: 12.9 % (ref 10–15)
ERYTHROCYTE [SEDIMENTATION RATE] IN BLOOD BY WESTERGREN METHOD: 4 MM/HR (ref 0–20)
GFR SERPL CREATININE-BSD FRML MDRD: >90 ML/MIN/1.73M2
GLUCOSE BLD-MCNC: 84 MG/DL (ref 70–99)
HCT VFR BLD AUTO: 40.2 % (ref 35–47)
HGB BLD-MCNC: 14 G/DL (ref 11.7–15.7)
IMM GRANULOCYTES # BLD: 0 10E3/UL
IMM GRANULOCYTES NFR BLD: 0 %
LYMPHOCYTES # BLD AUTO: 1.7 10E3/UL (ref 0.8–5.3)
LYMPHOCYTES NFR BLD AUTO: 30 %
MCH RBC QN AUTO: 28.7 PG (ref 26.5–33)
MCHC RBC AUTO-ENTMCNC: 34.8 G/DL (ref 31.5–36.5)
MCV RBC AUTO: 82 FL (ref 78–100)
MONOCYTES # BLD AUTO: 0.4 10E3/UL (ref 0–1.3)
MONOCYTES NFR BLD AUTO: 6 %
NEUTROPHILS # BLD AUTO: 3.1 10E3/UL (ref 1.6–8.3)
NEUTROPHILS NFR BLD AUTO: 56 %
NRBC # BLD AUTO: 0 10E3/UL
NRBC BLD AUTO-RTO: 0 /100
PLATELET # BLD AUTO: 266 10E3/UL (ref 150–450)
POTASSIUM BLD-SCNC: 3.8 MMOL/L (ref 3.4–5.3)
PROT SERPL-MCNC: 7.6 G/DL (ref 6.8–8.8)
RBC # BLD AUTO: 4.88 10E6/UL (ref 3.8–5.2)
SODIUM SERPL-SCNC: 140 MMOL/L (ref 133–144)
WBC # BLD AUTO: 5.6 10E3/UL (ref 4–11)

## 2021-09-03 PROCEDURE — 86140 C-REACTIVE PROTEIN: CPT

## 2021-09-03 PROCEDURE — 85652 RBC SED RATE AUTOMATED: CPT

## 2021-09-03 PROCEDURE — 73130 X-RAY EXAM OF HAND: CPT | Mod: RT | Performed by: RADIOLOGY

## 2021-09-03 PROCEDURE — 99214 OFFICE O/P EST MOD 30 MIN: CPT | Performed by: INTERNAL MEDICINE

## 2021-09-03 PROCEDURE — 80053 COMPREHEN METABOLIC PANEL: CPT

## 2021-09-03 PROCEDURE — 36415 COLL VENOUS BLD VENIPUNCTURE: CPT

## 2021-09-03 PROCEDURE — 85025 COMPLETE CBC W/AUTO DIFF WBC: CPT

## 2021-09-03 RX ORDER — HYDROXYCHLOROQUINE SULFATE 200 MG/1
400 TABLET, FILM COATED ORAL DAILY
Qty: 180 TABLET | Refills: 0 | Status: SHIPPED | OUTPATIENT
Start: 2021-09-03 | End: 2022-11-18

## 2021-09-03 ASSESSMENT — MIFFLIN-ST. JEOR: SCORE: 1585.89

## 2021-09-03 ASSESSMENT — PAIN SCALES - GENERAL: PAINLEVEL: NO PAIN (1)

## 2021-09-03 NOTE — PROGRESS NOTES
"Alka Casey's goals for this visit include: Return  She requests these members of her care team be copied on today's visit information: PCP    PCP: Rojelio Ivey    Referring Provider:  No referring provider defined for this encounter.    /76   Pulse 90   Temp 99  F (37.2  C)   Resp 14   Ht 1.626 m (5' 4\")   Wt 88.1 kg (194 lb 3.2 oz)   SpO2 98%   BMI 33.33 kg/m      Do you need any medication refills at today's visit? JARET Roa LPN  Pulmonary Medicine:  [facility]  Phone: [phone] Fax: [phone]      "

## 2021-09-03 NOTE — PATIENT INSTRUCTIONS
Get eye check in the next 3 months to continue the hydroxychloroquine.  Stop the hydroxychloroquine once you are attempting pregnancy  Continue with every 6 month lab testing  Follow up in 6 months

## 2021-09-03 NOTE — PROGRESS NOTES
Ms. 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 -DELL, -CCP, -RF, -Ro. No erosions.    She reports some left thumb IP chronic swelling and tenderness. She is a little stiff as well. This is a typical joint affected by her disease. For the most part her joints are good. No real swelling, redness or warmth today. No new deformities or dysfunction. Energy is lower with adequate sleep.  She is execising. She has some depression that contributes to all this. No AM stiffness.    Hydroxychloroquine 400 mg daily is well tolerated, but she needs an eye check. She is contemplating a second pregnancy.    PMI:  Medical-asthma, juvenile arthritis, low back facet lumbar syndrome, chronic depression, eosinophilic esophagitis, vitamin D insufficiency  Surgical-wisdom tooth extractions,   Injuries-toe fractures    FH:  GM with CHF  GM with colon cancer  Aunts, uncles with RA  Mother with OA, dysrhythmia, depression, and fibromyalgia  Father with dysrhythmia, allergies and foot  Sister is chronic anxiety  Son is healthy    SH:  Works as an , [employer]. , one son. No tobacco, social EtOH, no illicit drugs. No exposures to infectious hepatitis or HIV. Right handed.     PMSF history personally obtained and updated by me in this clinic visit.    ROS:  +left ankle acute rolling injury  +minor lactose intolerance  +frequent headaches and occasional migraines  +allergies to pets and seasonal allergies  Remainder of the 14 point ROS obtained and found negative.    Physical Exam:  Constitutional: WD-WN-WG cooperative; +obese  Eyes: nl EOM, PERRLA, vision, conjunctiva, sclera  ENT: nl external ears, nose, hearing, lips, teeth, gums, throat; +modest parotid swelling  Neck: no mass or thyroid enlargement  Resp: lungs clear to auscultation, nl to palpation, nl effort  CV: RRR, no murmurs, rubs or gallops, no edema  GI: no ABD mass or tenderness, no HSM, soft  : not  tested  Lymph: no cervical or epitrochlear nodes  MS:  +left wrist laxity; All other TMJ, neck, shoulder, elbow, wrist, hand, spine, hip, knee, ankle, and foot joints were examined and otherwise found normal. Normal  strength. Full ROM. Normal tuck and prayer.  Skin: no nail pitting, alopecia, rash  Neuro: nl cranial nerves, strength, sensation  Psych: nl judgement, orientation, memory, affect    Laboratory:    Component      Latest Ref Rng & Units 9/3/2021   WBC      4.0 - 11.0 10e3/uL 5.6   RBC Count      3.80 - 5.20 10e6/uL 4.88   Hemoglobin      11.7 - 15.7 g/dL 14.0   Hematocrit      35.0 - 47.0 % 40.2   MCV      78 - 100 fL 82   MCH      26.5 - 33.0 pg 28.7   MCHC      31.5 - 36.5 g/dL 34.8   RDW      10.0 - 15.0 % 12.9   Platelet Count      150 - 450 10e3/uL 266   % Neutrophils      % 56   % Lymphocytes      % 30   % Monocytes      % 6   % Eosinophils      % 7   % Basophils      % 1   % Immature Granulocytes      % 0   NRBCs per 100 WBC      <1 /100 0   Absolute Neutrophils      1.6 - 8.3 10e3/uL 3.1   Absolute Lymphocytes      0.8 - 5.3 10e3/uL 1.7   Absolute Monocytes      0.0 - 1.3 10e3/uL 0.4   Absolute Eosinophils      0.0 - 0.7 10e3/uL 0.4   Absolute Basophils      0.0 - 0.2 10e3/uL 0.1   Absolute Immature Granulocytes      <=0.0 10e3/uL 0.0   Absolute NRBCs      10e3/uL 0.0   Sodium      133 - 144 mmol/L 140   Potassium      3.4 - 5.3 mmol/L 3.8   Chloride      94 - 109 mmol/L 110 (H)   Carbon Dioxide      20 - 32 mmol/L 25   Anion Gap      3 - 14 mmol/L 5   Urea Nitrogen      7 - 30 mg/dL 7   Creatinine      0.52 - 1.04 mg/dL 0.76   Calcium      8.5 - 10.1 mg/dL 8.4 (L)   Glucose      70 - 99 mg/dL 84   Alkaline Phosphatase      40 - 150 U/L 90   AST      0 - 45 U/L 29   ALT      0 - 50 U/L 30   Protein Total      6.8 - 8.8 g/dL 7.6   Albumin      3.4 - 5.0 g/dL 3.8   Bilirubin Total      0.2 - 1.3 mg/dL 0.6   GFR Estimate      >60 mL/min/1.73m2 >90   CRP Inflammation      0.0 - 8.0 mg/L 6.6   Sed  Rate      0 - 20 mm/hr 4     Component      Latest Ref Rng & Units 7/18/2014 12/5/2014   Cyclic Cit Pept IgG/IgA      <20 UNITS <20 . . .    DELL Screen by EIA      <1.0 <1.0 . . .    Rheumatoid Factor      <20 IU/mL <20    SSA (Ro) (ART) Antibody, IgG      0.0 - 0.9 AI  <0.2 . . .     Impression:    Pauciarticular Chronic Juvenile Rheumatoid Arthritis-this remains quiet and I appreciate no signs of active inflammation. Good tolerance of the hydroxychloroquine and we will continue this until she will try to get pregnant, at which time she will stop the medication.     Eosinophilic esophagitis-now improved with protonix.    Long term management of immunosuppression-with stable lab baseline today. Plan repeat eye check in the next 3 months. We also agree that the benefits of continued immunosuppression outweigh the risks of COVID-19 infection.  She is COVID vaccinated.    RTC in 6 months.      A total of 20 minutes was spent in face to face patient interaction and chart review on the day of service.

## 2021-10-23 ENCOUNTER — HEALTH MAINTENANCE LETTER (OUTPATIENT)
Age: 31
End: 2021-10-23

## 2021-11-12 ENCOUNTER — TRANSFERRED RECORDS (OUTPATIENT)
Dept: HEALTH INFORMATION MANAGEMENT | Facility: CLINIC | Age: 31
End: 2021-11-12
Payer: COMMERCIAL

## 2021-12-13 DIAGNOSIS — K20.0 EOSINOPHILIC ESOPHAGITIS: ICD-10-CM

## 2021-12-14 RX ORDER — PANTOPRAZOLE SODIUM 40 MG/1
TABLET, DELAYED RELEASE ORAL
Qty: 180 TABLET | Refills: 1 | Status: SHIPPED | OUTPATIENT
Start: 2021-12-14 | End: 2022-07-13

## 2022-03-24 ENCOUNTER — TRANSFERRED RECORDS (OUTPATIENT)
Dept: HEALTH INFORMATION MANAGEMENT | Facility: CLINIC | Age: 32
End: 2022-03-24
Payer: COMMERCIAL

## 2022-03-24 ENCOUNTER — MEDICAL CORRESPONDENCE (OUTPATIENT)
Dept: HEALTH INFORMATION MANAGEMENT | Facility: CLINIC | Age: 32
End: 2022-03-24
Payer: COMMERCIAL

## 2022-03-25 ENCOUNTER — MYC MEDICAL ADVICE (OUTPATIENT)
Dept: RHEUMATOLOGY | Facility: CLINIC | Age: 32
End: 2022-03-25
Payer: COMMERCIAL

## 2022-03-25 NOTE — PROGRESS NOTES
Alka is a 31 year old who is being evaluated via a billable video visit.      How would you like to obtain your AVS? MyChart  If the video visit is dropped, the invitation should be resent by: Text to cell phone: [phone]  Will anyone else be joining your video visit? No      Video Start Time: 7:47 AM       Ms. 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 -DELL, -CCP, -RF, -Ro. No erosions. Currently pregnant with twins at 11 weeks.    She is pregnant at 11 weeks, and now off of her arthritis medications. She continues to have some swelling in her fingers. No real pain. Her hand function remains good, and no redness or warmth. No dysfunction or deformities. She also has low back pain. So far the pregnancy has been uncomplicated. Her energy is low despite good sleep. She will take some naps. No AM stiffness. No current medications for her joints.     Hydroxychloroquine 400 mg daily is currently held. Reported normal eye evaluation in November.    PMI:  Medical-asthma, juvenile arthritis, low back facet lumbar syndrome, chronic depression, eosinophilic esophagitis, vitamin D insufficiency  Surgical-wisdom tooth extractions,   Injuries-toe fractures    FH:  GM with CHF  GM with colon cancer  Aunts, uncles with RA  Mother with OA, dysrhythmia, depression, fibromyalgia and ITP  Father with dysrhythmia, allergies and foot  Sister is chronic anxiety  Son is healthy    SH:  Works as an , [employer]. , one son. No tobacco, social EtOH, no illicit drugs. No exposures to infectious hepatitis or HIV. Right handed.     PMSF history personally obtained and updated by me in this clinic visit.    ROS:  +nausea  +minor wheezing  +anxiety and depression  +allergies to pets and seasonal allergies  Remainder of the 14 point ROS obtained and found negative.    Physical Exam:  Constitutional: WD-WN-WG cooperative; +obese  Eyes: nl EOM, sclera  ENT: nl external ears, nose, hearing,  lips; +modest parotid swelling  Neck: no visible thyroid enlargement  Resp: nl effort  MS: All neck, shoulder, elbow, wrist, hand joints were examined and otherwise found normal. No active synovitis or deformity. Full ROM. Normal tuck and prayer.  Skin: no alopecia, rash  Neuro: nl cranial nerves   Psych: nl judgement, affect    Laboratory:    Study Result    Narrative & Impression   3 views bilateral hand radiographs 9/3/2021 1:12 PM     History: Juvenile rheumatoid arthritis (H); Long-term use of  immunosuppressant medication; Eosinophilic esophagitis      Comparison: 9/20/2019     Findings:     PA, oblique and lateral view(s) of the hands were obtained.      Left:     No acute osseous abnormality.  No erosion.     Mild degenerative changes of the first carpometacarpal and triscaphe  joints.       Soft tissue is unremarkable.     Right:     No acute osseous abnormality.  No erosion.     Mild degenerative changes of the first carpometacarpal and triscaphe  joints.       Soft tissue is unremarkable.                                                                      Impression:  1. No acute osseous abnormality. No erosion.  2. No substantial degenerative change.     SHEEBA HARRINGTON MD (Joe)        Component      Latest Ref Rng & Units 4/13/2022   WBC      4.0 - 11.0 10e3/uL 6.7   RBC Count      3.80 - 5.20 10e6/uL 4.46   Hemoglobin      11.7 - 15.7 g/dL 12.9   Hematocrit      35.0 - 47.0 % 37.5   MCV      78 - 100 fL 84   MCH      26.5 - 33.0 pg 28.9   MCHC      31.5 - 36.5 g/dL 34.4   RDW      10.0 - 15.0 % 13.8   Platelet Count      150 - 450 10e3/uL 257   % Neutrophils      % 70   % Lymphocytes      % 22   % Monocytes      % 5   % Eosinophils      % 4   % Basophils      % 0   Absolute Neutrophils      1.6 - 8.3 10e3/uL 4.7   Absolute Lymphocytes      0.8 - 5.3 10e3/uL 1.5   Absolute Monocytes      0.0 - 1.3 10e3/uL 0.3   Absolute Eosinophils      0.0 - 0.7 10e3/uL 0.2   Absolute Basophils      0.0 - 0.2  10e3/uL 0.0   Sodium      133 - 144 mmol/L 139   Potassium      3.4 - 5.3 mmol/L 4.0   Chloride      94 - 109 mmol/L 110 (H)   Carbon Dioxide      20 - 32 mmol/L 21   Anion Gap      3 - 14 mmol/L 8   Urea Nitrogen      7 - 30 mg/dL 4 (L)   Creatinine      0.52 - 1.04 mg/dL 0.48 (L)   Calcium      8.5 - 10.1 mg/dL 8.4 (L)   Glucose      70 - 99 mg/dL 113 (H)   Alkaline Phosphatase      40 - 150 U/L 87   AST      0 - 45 U/L 14   ALT      0 - 50 U/L 17   Protein Total      6.8 - 8.8 g/dL 6.7 (L)   Albumin      3.4 - 5.0 g/dL 3.2 (L)   Bilirubin Total      0.2 - 1.3 mg/dL 0.4   GFR Estimate      >60 mL/min/1.73m2 >90   Color Urine      Colorless, Straw, Light Yellow, Yellow Yellow   Appearance Urine      Clear Clear   Glucose Urine      Negative mg/dL Negative   Bilirubin Urine      Negative Negative   Ketones Urine      Negative mg/dL Negative   Specific Gravity Urine      1.003 - 1.035 1.020   Blood Urine      Negative Negative   pH Urine      5.0 - 7.0 7.0   Protein Albumin Urine      Negative mg/dL Negative   Urobilinogen Urine      0.2, 1.0 E.U./dL 0.2   Nitrite Urine      Negative Negative   Leukocyte Esterase Urine      Negative Large (A)   Bacteria Urine      None Seen /HPF Few (A)   RBC Urine      0-2 /HPF /HPF 0-2   WBC Urine      0-5 /HPF /HPF 5-10 (A)   Squamous Epithelial /LPF Urine      None Seen /LPF Moderate (A)   Mucus Urine      None Seen /LPF Present (A)   Hepatitis C Antibody      Nonreactive Nonreactive   Sed Rate      0 - 20 mm/hr 10   CRP Inflammation      0.0 - 8.0 mg/L 10.7 (H)       Impression:    Pauciarticular Chronic Juvenile Rheumatoid Arthritis-now complicated with new pregnancy. She is describing mild hand puffiness, not clearly joint swelling. Otherwise no signs or symptoms of active synovitis. Xrays are reassuring. Based on all of this we will forego use of immunsuppression at this time and concern ourselves with the risk of flaring postpartum. Plan to repeat inflammatory markers.  Tylenol for modest arthralgias.    Eosinophilic esophagitis-with stable dysphagia and occasional heartburn.    Long term management of immunosuppression-with no important toxicity and reported normal eye evaluation. Plan to get the eye report.    Follow up in 3-6 months.      A total of 30 minutes was spent in face to face patient interaction and chart review on the day of service.        Video-Visit Details    Type of service:  Video Visit    Video End Time:8:04 AM    Originating Location (pt. Location): Home    Distant Location (provider location):  [facility]     Platform used for Video Visit: [employer]

## 2022-03-28 NOTE — TELEPHONE ENCOUNTER
Plaquenil eye exam abstracted into flow sheets. Notes to be scanned into chart.      ROZ Munroe [facility]

## 2022-04-13 ENCOUNTER — LAB (OUTPATIENT)
Dept: LAB | Facility: CLINIC | Age: 32
End: 2022-04-13
Payer: COMMERCIAL

## 2022-04-13 DIAGNOSIS — Z79.60 LONG-TERM USE OF IMMUNOSUPPRESSANT MEDICATION: ICD-10-CM

## 2022-04-13 DIAGNOSIS — M08.00 JUVENILE RHEUMATOID ARTHRITIS (H): ICD-10-CM

## 2022-04-13 DIAGNOSIS — Z11.59 NEED FOR HEPATITIS C SCREENING TEST: ICD-10-CM

## 2022-04-13 DIAGNOSIS — K20.0 EOSINOPHILIC ESOPHAGITIS: ICD-10-CM

## 2022-04-13 LAB
ALBUMIN SERPL-MCNC: 3.2 G/DL (ref 3.4–5)
ALBUMIN UR-MCNC: NEGATIVE MG/DL
ALP SERPL-CCNC: 87 U/L (ref 40–150)
ALT SERPL W P-5'-P-CCNC: 17 U/L (ref 0–50)
ANION GAP SERPL CALCULATED.3IONS-SCNC: 8 MMOL/L (ref 3–14)
APPEARANCE UR: CLEAR
AST SERPL W P-5'-P-CCNC: 14 U/L (ref 0–45)
BACTERIA #/AREA URNS HPF: ABNORMAL /HPF
BASOPHILS # BLD AUTO: 0 10E3/UL (ref 0–0.2)
BASOPHILS NFR BLD AUTO: 0 %
BILIRUB SERPL-MCNC: 0.4 MG/DL (ref 0.2–1.3)
BILIRUB UR QL STRIP: NEGATIVE
BUN SERPL-MCNC: 4 MG/DL (ref 7–30)
CALCIUM SERPL-MCNC: 8.4 MG/DL (ref 8.5–10.1)
CHLORIDE BLD-SCNC: 110 MMOL/L (ref 94–109)
CO2 SERPL-SCNC: 21 MMOL/L (ref 20–32)
COLOR UR AUTO: YELLOW
CREAT SERPL-MCNC: 0.48 MG/DL (ref 0.52–1.04)
CRP SERPL-MCNC: 10.7 MG/L (ref 0–8)
EOSINOPHIL # BLD AUTO: 0.2 10E3/UL (ref 0–0.7)
EOSINOPHIL NFR BLD AUTO: 4 %
ERYTHROCYTE [DISTWIDTH] IN BLOOD BY AUTOMATED COUNT: 13.8 % (ref 10–15)
ERYTHROCYTE [SEDIMENTATION RATE] IN BLOOD BY WESTERGREN METHOD: 10 MM/HR (ref 0–20)
GFR SERPL CREATININE-BSD FRML MDRD: >90 ML/MIN/1.73M2
GLUCOSE BLD-MCNC: 113 MG/DL (ref 70–99)
GLUCOSE UR STRIP-MCNC: NEGATIVE MG/DL
HCT VFR BLD AUTO: 37.5 % (ref 35–47)
HGB BLD-MCNC: 12.9 G/DL (ref 11.7–15.7)
HGB UR QL STRIP: NEGATIVE
KETONES UR STRIP-MCNC: NEGATIVE MG/DL
LEUKOCYTE ESTERASE UR QL STRIP: ABNORMAL
LYMPHOCYTES # BLD AUTO: 1.5 10E3/UL (ref 0.8–5.3)
LYMPHOCYTES NFR BLD AUTO: 22 %
MCH RBC QN AUTO: 28.9 PG (ref 26.5–33)
MCHC RBC AUTO-ENTMCNC: 34.4 G/DL (ref 31.5–36.5)
MCV RBC AUTO: 84 FL (ref 78–100)
MONOCYTES # BLD AUTO: 0.3 10E3/UL (ref 0–1.3)
MONOCYTES NFR BLD AUTO: 5 %
MUCOUS THREADS #/AREA URNS LPF: PRESENT /LPF
NEUTROPHILS # BLD AUTO: 4.7 10E3/UL (ref 1.6–8.3)
NEUTROPHILS NFR BLD AUTO: 70 %
NITRATE UR QL: NEGATIVE
PH UR STRIP: 7 [PH] (ref 5–7)
PLATELET # BLD AUTO: 257 10E3/UL (ref 150–450)
POTASSIUM BLD-SCNC: 4 MMOL/L (ref 3.4–5.3)
PROT SERPL-MCNC: 6.7 G/DL (ref 6.8–8.8)
RBC # BLD AUTO: 4.46 10E6/UL (ref 3.8–5.2)
RBC #/AREA URNS AUTO: ABNORMAL /HPF
SODIUM SERPL-SCNC: 139 MMOL/L (ref 133–144)
SP GR UR STRIP: 1.02 (ref 1–1.03)
SQUAMOUS #/AREA URNS AUTO: ABNORMAL /LPF
UROBILINOGEN UR STRIP-ACNC: 0.2 E.U./DL
WBC # BLD AUTO: 6.7 10E3/UL (ref 4–11)
WBC #/AREA URNS AUTO: ABNORMAL /HPF

## 2022-04-13 PROCEDURE — 85025 COMPLETE CBC W/AUTO DIFF WBC: CPT

## 2022-04-13 PROCEDURE — 80053 COMPREHEN METABOLIC PANEL: CPT

## 2022-04-13 PROCEDURE — 81001 URINALYSIS AUTO W/SCOPE: CPT

## 2022-04-13 PROCEDURE — 85652 RBC SED RATE AUTOMATED: CPT

## 2022-04-13 PROCEDURE — 86140 C-REACTIVE PROTEIN: CPT

## 2022-04-13 PROCEDURE — 36415 COLL VENOUS BLD VENIPUNCTURE: CPT

## 2022-04-13 PROCEDURE — 86803 HEPATITIS C AB TEST: CPT

## 2022-04-14 LAB — HCV AB SERPL QL IA: NONREACTIVE

## 2022-04-15 ENCOUNTER — VIRTUAL VISIT (OUTPATIENT)
Dept: RHEUMATOLOGY | Facility: CLINIC | Age: 32
End: 2022-04-15
Payer: COMMERCIAL

## 2022-04-15 ENCOUNTER — TRANSFERRED RECORDS (OUTPATIENT)
Dept: HEALTH INFORMATION MANAGEMENT | Facility: CLINIC | Age: 32
End: 2022-04-15

## 2022-04-15 ENCOUNTER — MEDICAL CORRESPONDENCE (OUTPATIENT)
Dept: HEALTH INFORMATION MANAGEMENT | Facility: CLINIC | Age: 32
End: 2022-04-15

## 2022-04-15 DIAGNOSIS — Z79.60 LONG-TERM USE OF IMMUNOSUPPRESSANT MEDICATION: ICD-10-CM

## 2022-04-15 DIAGNOSIS — K20.0 EOSINOPHILIC ESOPHAGITIS: ICD-10-CM

## 2022-04-15 DIAGNOSIS — E55.9 VITAMIN D INSUFFICIENCY: ICD-10-CM

## 2022-04-15 DIAGNOSIS — M08.00 JUVENILE RHEUMATOID ARTHRITIS (H): Primary | ICD-10-CM

## 2022-04-15 PROCEDURE — 99214 OFFICE O/P EST MOD 30 MIN: CPT | Mod: 95 | Performed by: INTERNAL MEDICINE

## 2022-04-15 RX ORDER — CETIRIZINE HYDROCHLORIDE 10 MG/1
TABLET ORAL
COMMUNITY

## 2022-04-15 NOTE — NURSING NOTE
Patient declined to schedule lab appointment at this time. She notes from September to October she might be delivering. Staff informed patient she can call back to schedule lab appointment at her convenience or we can send lab orders to [facility] and she can have labs done there with her OB labs. Care everywhere updated for [facility].      ROZ Munroe  [facility] Rheumatology

## 2022-04-15 NOTE — PATIENT INSTRUCTIONS
Plan to follow up in 3-6 months virtually  Get laboratory testing every 6 months  Get the eye evaluation report from November

## 2022-04-22 ENCOUNTER — TRANSCRIBE ORDERS (OUTPATIENT)
Dept: CARDIOLOGY | Facility: CLINIC | Age: 32
End: 2022-04-22
Payer: COMMERCIAL

## 2022-04-22 DIAGNOSIS — O30.039 MONOCHORIONIC DIAMNIOTIC TWIN GESTATION: Primary | ICD-10-CM

## 2022-06-24 ENCOUNTER — HOSPITAL ENCOUNTER (OUTPATIENT)
Dept: CARDIOLOGY | Facility: CLINIC | Age: 32
Discharge: HOME | End: 2022-06-24
Payer: COMMERCIAL

## 2022-06-24 DIAGNOSIS — O30.039 TWIN GESTATION, MONOCHORIONIC DIAMNIOTIC: ICD-10-CM

## 2022-06-24 DIAGNOSIS — O30.039 MONOCHORIONIC DIAMNIOTIC TWIN GESTATION: ICD-10-CM

## 2022-06-24 PROCEDURE — 76827 ECHO EXAM OF FETAL HEART: CPT | Mod: 26 | Performed by: PEDIATRICS

## 2022-06-24 PROCEDURE — 76825 ECHO EXAM OF FETAL HEART: CPT | Mod: 26 | Performed by: PEDIATRICS

## 2022-06-24 PROCEDURE — 93325 DOPPLER ECHO COLOR FLOW MAPG: CPT | Mod: 26 | Performed by: PEDIATRICS

## 2022-06-24 PROCEDURE — 93325 DOPPLER ECHO COLOR FLOW MAPG: CPT

## 2022-07-13 ENCOUNTER — VIRTUAL VISIT (OUTPATIENT)
Dept: GASTROENTEROLOGY | Facility: CLINIC | Age: 32
End: 2022-07-13
Payer: COMMERCIAL

## 2022-07-13 VITALS — BODY MASS INDEX: 34.67 KG/M2 | WEIGHT: 202 LBS

## 2022-07-13 DIAGNOSIS — K20.0 EOSINOPHILIC ESOPHAGITIS: ICD-10-CM

## 2022-07-13 PROCEDURE — 99213 OFFICE O/P EST LOW 20 MIN: CPT | Mod: 95 | Performed by: INTERNAL MEDICINE

## 2022-07-13 RX ORDER — PANTOPRAZOLE SODIUM 40 MG/1
40 TABLET, DELAYED RELEASE ORAL 2 TIMES DAILY
Qty: 180 TABLET | Refills: 3 | Status: SHIPPED | OUTPATIENT
Start: 2022-07-13 | End: 2023-05-01

## 2022-07-13 ASSESSMENT — PAIN SCALES - GENERAL: PAINLEVEL: NO PAIN (0)

## 2022-07-13 NOTE — PROGRESS NOTES
Alka is a 31 year old who is being evaluated via a billable video visit.      How would you like to obtain your AVS? MyChart  If the video visit is dropped, the invitation should be resent by: Send to e-mail at: [email or web address]  Will anyone else be joining your video visit? No

## 2022-07-13 NOTE — PROGRESS NOTES
HPI:    Alka  presents today for a video visit for follow-up of EoE.  Is currently 6 months pregnant with twins - stopped her PPI about 3 months prior to getting pregnant.  Is not currently having issues with swallowing.  Stopped PPI prior to her previous pregnancy as well and did well up until a few weeks after delivery when her symptoms returned.    No issues with swallowing at present.  Did struggle a lot with nausea/vomiting earlier in pregnancy but that is improving      Past Medical History:   Diagnosis Date     Allergic rhinitis due to animal dander      Allergy to mold spores      Depression      Diagnostic skin and sensitization tests     skin tests per Dr. Osman pos. for: cat/dog/CR/DM/M/T/G/W and many foods--NO IgE f/u tests on foods.      Eosinophilic esophagitis Dx approx  with EGD with bx.    NO HELP with food elimination diet based on food skin tests positives.     House dust mite allergy      Intermittent asthma      Juvenile rheumatoid arthritis (H)      Low back strain     intermittent     Need for desensitization to allergens IT started  for cat/dog/DM/M/T/G/W     Overweight (BMI 25.0-29.9) 12/10/2015     Seasonal allergic conjunctivitis      Seasonal allergic rhinitis        Past Surgical History:   Procedure Laterality Date      SECTION  2019     COMBINED ESOPHAGOSCOPY, GASTROSCOPY, DUODENOSCOPY (EGD) WITH CO2 INSUFFLATION N/A 2016    Procedure: COMBINED ESOPHAGOSCOPY, GASTROSCOPY, DUODENOSCOPY (EGD) WITH CO2 INSUFFLATION;  Surgeon: Duane, William Charles, MD;  Location:  OR     COMBINED ESOPHAGOSCOPY, GASTROSCOPY, DUODENOSCOPY (EGD) WITH CO2 INSUFFLATION N/A 2021    Procedure: ESOPHAGOGASTRODUODENOSCOPY, WITH CO2 INSUFFLATION;  Surgeon: Josy Giles DO;  Location:  OR     COMBINED ESOPHAGOSCOPY, GASTROSCOPY, DUODENOSCOPY (EGD) WITH CO2 INSUFFLATION N/A 2021    Procedure: ESOPHAGOGASTRODUODENOSCOPY, WITH CO2 INSUFFLATION;  Surgeon:  Josy Giles MD;  Location: [facility] OR     ESOPHAGOSCOPY, GASTROSCOPY, DUODENOSCOPY (EGD), COMBINED N/A 7/20/2016    Procedure: COMBINED ESOPHAGOSCOPY, GASTROSCOPY, DUODENOSCOPY (EGD), BIOPSY SINGLE OR MULTIPLE;  Surgeon: Duane, William Charles, MD;  Location: [facility] OR     ESOPHAGOSCOPY, GASTROSCOPY, DUODENOSCOPY (EGD), COMBINED N/A 4/30/2021    Procedure: Esophagogastroduodenoscopy, With Biopsy;  Surgeon: Josy Giles DO;  Location: [facility] OR     ESOPHAGOSCOPY, GASTROSCOPY, DUODENOSCOPY (EGD), COMBINED N/A 6/24/2021    Procedure: Esophagogastroduodenoscopy, With Biopsy;  Surgeon: Josy Giles MD;  Location: [facility] OR     EXTRACTION(S) DENTAL      4 wisdom      GI SURGERY      EGD       Family History   Problem Relation Age of Onset     Osteoarthritis Mother      Hypertension Mother      Hyperlipidemia Father      Cataracts Maternal Grandmother      Cataracts Paternal Grandmother      Osteoarthritis Paternal Grandmother      Heart Failure Paternal Grandmother      Breast Cancer Paternal Grandmother      Cancer Paternal Grandmother      Rheumatoid Arthritis Paternal Aunt      Glaucoma Paternal Aunt      Cerebrovascular Disease Paternal Grandfather      Arrhythmia Paternal Grandfather      Cancer Paternal Grandfather      Diabetes Paternal Grandfather      Myocardial Infarction Maternal Grandfather      Colon Cancer Maternal Grandfather      Thyroid Disease Other      Anesthesia Reaction No family hx of      Bleeding Disorder No family hx of      Thrombophilia No family hx of      Macular Degeneration No family hx of        Social History     Tobacco Use     Smoking status: Never Smoker     Smokeless tobacco: Never Used   Substance Use Topics     Alcohol use: Yes     Alcohol/week: 0.0 standard drinks     Comment: 5-6 drinks a month        O:    Gen: no acute distress  HEENT: NCAT  Neck: normal ROM  Resp: nonlabored breathing  Neuro: no gross deficits  Psych: appropriate mood and affect    Assessment and Plan:    # EoE  - PPI responsive - currently off all therapy while pregnant and doing well - if symptoms return - would be reasonable (if okay with OBGYN) to resume but for now okay to stay off with plan to resume after delivery.    RTC 12 months or sooner if needed    Josy Giles, DO     Video-Visit Details     Type of service:  Video Visit     Video Start Time: 2:30 PM  Video End Time (time video stopped): 2:39 PM    Originating Location (pt. Location): home     Distant Location (provider location):  [facility]      Mode of Communication:  Video Conference via [email or web address]

## 2022-07-13 NOTE — PATIENT INSTRUCTIONS
Congratulations again on your pregnancy and I hope the next few months go well.  If your swallowing issues start to come back before delivery let me and your OBGYN know - we may need to resume your protonix.  If you're doing well we can wait until after you deliver.    Please let me know if you have any questions or concerns.

## 2022-07-14 ENCOUNTER — TELEPHONE (OUTPATIENT)
Dept: GASTROENTEROLOGY | Facility: CLINIC | Age: 32
End: 2022-07-14

## 2022-07-14 NOTE — TELEPHONE ENCOUNTER
Left Voicemail (1st Attempt) for the patient to call back and schedule the following:    Appointment type: 1 year follow up   Provider: Jose  Return date: 7/12/2023  Specialty phone number: [phone]  Additional appointment(s) needed: aime Wheeler  Procedure    Cardiology, Rheumatology, GI, Pulmonology, Nephrology Specialties   [facility]  [phone]

## 2022-07-25 NOTE — PROGRESS NOTES
Alka is a 32 year old who is being evaluated via a billable video visit.      How would you like to obtain your AVS? MyChart  If the video visit is dropped, the invitation should be resent by: Text to cell phone: [phone]  Will anyone else be joining your video visit? No          Ms. 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 -DELL, -CCP, -RF, -Ro. No erosions. Currently pregnant with twins at 29 weeks gestation. Delivery planned for 37 weeks .     She is uncomfortable with twins. Nevertheless, her arthritis is very stable and no real joint pains. She is knitting. She has a little concern about flaring post delivery but no concerns presently. They are monitoring carefully for gestational diabetes. No swelling, redness or warmth. No joint dysfunction or deformity. Energy is low with poor sleep and restless leg syndrome. No AM stiffness.  No use of analgesics or NSAIDs. Her esophagus is stable. She does not intend to breast feed for any extended period. History of postpartum depression.    PMI:  Medical-asthma, juvenile arthritis, low back facet lumbar syndrome, chronic depression, eosinophilic esophagitis, vitamin D insufficiency  Surgical-wisdom tooth extractions,   Injuries-toe fractures    FH:  GM with CHF  GM with colon cancer  Aunts, uncles with RA  Mother with OA, dysrhythmia, depression, fibromyalgia and ITP  Father with dysrhythmia, allergies and foot  Sister is chronic anxiety  Son is healthy    SH:  Works as an , [employer]. , one son. No tobacco, social EtOH, no illicit drugs. No exposures to infectious hepatitis or HIV. Right handed.     PMSF history personally obtained and updated by me in this clinic visit.    ROS:  +as above  +allergies to pets and seasonal allergies  Remainder of the 14 point ROS obtained and found negative.    Physical Exam:  Constitutional: WD-WN-WG cooperative; +obese  Eyes: nl EOM, sclera  ENT: nl external ears,  nose, hearing, lips; +modest parotid swelling  Neck: no visible thyroid enlargement  Resp: nl effort  MS: All neck, shoulder, elbow, wrist, hand joints were examined and otherwise found normal. No active synovitis or deformity. Full ROM. Normal tuck and prayer.  Skin: no alopecia, rash  Neuro: nl cranial nerves   Psych: nl judgement, affect    Laboratory:    No recent testing    Impression:    Pauciarticular Chronic Juvenile Rheumatoid Arthritis-this remains quiescent during pregnancy. Nevertheless, I expect that she will flare postpartum and we anticipate starting hydroxychloroquine soon after delivery. Get inflammatory markers in about 3 months.    Eosinophilic esophagitis-no concerns today and she plans to restart medication postpartum.    Long term management of immunosuppression-anticipate that she will need an eye evaluation around the time of restarting her hydroxychloroquine.    Follow up on November 18th.      A total of 47 minutes was spent in face to face patient interaction and chart review on the day of service.      Video-Visit Details    Video Start Time: 8:50 AM    Type of service:  Video Visit    Video End Time:9:10 AM    Originating Location (pt. Location): Home    Distant Location (provider location):  [facility]     Platform used for Video Visit: [employer]

## 2022-08-19 ENCOUNTER — VIRTUAL VISIT (OUTPATIENT)
Dept: RHEUMATOLOGY | Facility: CLINIC | Age: 32
End: 2022-08-19
Payer: COMMERCIAL

## 2022-08-19 DIAGNOSIS — K20.0 EOSINOPHILIC ESOPHAGITIS: Primary | ICD-10-CM

## 2022-08-19 DIAGNOSIS — Z79.60 LONG-TERM USE OF IMMUNOSUPPRESSANT MEDICATION: ICD-10-CM

## 2022-08-19 DIAGNOSIS — M08.00 JUVENILE RHEUMATOID ARTHRITIS (H): ICD-10-CM

## 2022-08-19 DIAGNOSIS — E55.9 VITAMIN D INSUFFICIENCY: ICD-10-CM

## 2022-08-19 PROCEDURE — 99215 OFFICE O/P EST HI 40 MIN: CPT | Mod: 95 | Performed by: INTERNAL MEDICINE

## 2022-08-19 NOTE — PATIENT INSTRUCTIONS
Plan next visit for 11:45 on November 18th  Get lab testing in the week prior to this visit  Let me know if any signs of arthritis develop  Plan to get a hydroxychloroquine eye check prior to starting the medication this fall

## 2022-08-26 ENCOUNTER — LAB REQUISITION (OUTPATIENT)
Dept: LAB | Facility: CLINIC | Age: 32
End: 2022-08-26

## 2022-08-26 DIAGNOSIS — Z34.90 ENCOUNTER FOR SUPERVISION OF NORMAL PREGNANCY, UNSPECIFIED, UNSPECIFIED TRIMESTER: ICD-10-CM

## 2022-08-26 PROCEDURE — 87086 URINE CULTURE/COLONY COUNT: CPT | Performed by: OBSTETRICS & GYNECOLOGY

## 2022-08-28 LAB — BACTERIA UR CULT: NO GROWTH

## 2022-09-09 ENCOUNTER — LAB REQUISITION (OUTPATIENT)
Dept: LAB | Facility: CLINIC | Age: 32
End: 2022-09-09

## 2022-09-09 DIAGNOSIS — Z3A.32 32 WEEKS GESTATION OF PREGNANCY: ICD-10-CM

## 2022-09-09 PROCEDURE — 87653 STREP B DNA AMP PROBE: CPT | Performed by: OBSTETRICS & GYNECOLOGY

## 2022-09-10 LAB — GP B STREP DNA SPEC QL NAA+PROBE: NEGATIVE

## 2022-09-15 ENCOUNTER — LAB REQUISITION (OUTPATIENT)
Dept: LAB | Facility: CLINIC | Age: 32
End: 2022-09-15

## 2022-09-15 DIAGNOSIS — D64.9 ANEMIA, UNSPECIFIED: ICD-10-CM

## 2022-09-15 LAB
ERYTHROCYTE [DISTWIDTH] IN BLOOD BY AUTOMATED COUNT: 13.8 % (ref 10–15)
FERRITIN SERPL-MCNC: 16 NG/ML (ref 6–175)
HCT VFR BLD AUTO: 30.9 % (ref 35–47)
HGB BLD-MCNC: 9.7 G/DL (ref 11.7–15.7)
MCH RBC QN AUTO: 24.7 PG (ref 26.5–33)
MCHC RBC AUTO-ENTMCNC: 31.4 G/DL (ref 31.5–36.5)
MCV RBC AUTO: 79 FL (ref 78–100)
PLATELET # BLD AUTO: 212 10E3/UL (ref 150–450)
RBC # BLD AUTO: 3.93 10E6/UL (ref 3.8–5.2)
WBC # BLD AUTO: 7.4 10E3/UL (ref 4–11)

## 2022-09-15 PROCEDURE — 82728 ASSAY OF FERRITIN: CPT | Performed by: OBSTETRICS & GYNECOLOGY

## 2022-09-15 PROCEDURE — 85027 COMPLETE CBC AUTOMATED: CPT | Performed by: OBSTETRICS & GYNECOLOGY

## 2022-10-03 ENCOUNTER — LAB REQUISITION (OUTPATIENT)
Dept: LAB | Facility: CLINIC | Age: 32
End: 2022-10-03
Payer: COMMERCIAL

## 2022-10-03 ENCOUNTER — LAB REQUISITION (OUTPATIENT)
Dept: LAB | Facility: CLINIC | Age: 32
End: 2022-10-03

## 2022-10-03 DIAGNOSIS — Z01.89 ENCOUNTER FOR OTHER SPECIFIED SPECIAL EXAMINATIONS: ICD-10-CM

## 2022-10-03 DIAGNOSIS — R03.0 ELEVATED BLOOD-PRESSURE READING, WITHOUT DIAGNOSIS OF HYPERTENSION: ICD-10-CM

## 2022-10-03 LAB
ALBUMIN MFR UR ELPH: 71.5 MG/DL
ALT SERPL W P-5'-P-CCNC: 13 U/L (ref 10–35)
AST SERPL W P-5'-P-CCNC: 29 U/L (ref 10–35)
BASOPHILS # BLD AUTO: 0 10E3/UL (ref 0–0.2)
BASOPHILS NFR BLD AUTO: 1 %
CREAT SERPL-MCNC: 0.59 MG/DL (ref 0.51–0.95)
CREAT UR-MCNC: 232 MG/DL
EOSINOPHIL # BLD AUTO: 0.3 10E3/UL (ref 0–0.7)
EOSINOPHIL NFR BLD AUTO: 4 %
ERYTHROCYTE [DISTWIDTH] IN BLOOD BY AUTOMATED COUNT: 19.9 % (ref 10–15)
GFR SERPL CREATININE-BSD FRML MDRD: >90 ML/MIN/1.73M2
HCT VFR BLD AUTO: 33.6 % (ref 35–47)
HGB BLD-MCNC: 10.2 G/DL (ref 11.7–15.7)
IMM GRANULOCYTES # BLD: 0.1 10E3/UL
IMM GRANULOCYTES NFR BLD: 1 %
LYMPHOCYTES # BLD AUTO: 1.4 10E3/UL (ref 0.8–5.3)
LYMPHOCYTES NFR BLD AUTO: 23 %
MCH RBC QN AUTO: 24.9 PG (ref 26.5–33)
MCHC RBC AUTO-ENTMCNC: 30.4 G/DL (ref 31.5–36.5)
MCV RBC AUTO: 82 FL (ref 78–100)
MONOCYTES # BLD AUTO: 0.4 10E3/UL (ref 0–1.3)
MONOCYTES NFR BLD AUTO: 6 %
NEUTROPHILS # BLD AUTO: 4.1 10E3/UL (ref 1.6–8.3)
NEUTROPHILS NFR BLD AUTO: 65 %
NRBC # BLD AUTO: 0 10E3/UL
NRBC BLD AUTO-RTO: 1 /100
PLATELET # BLD AUTO: 229 10E3/UL (ref 150–450)
PROT/CREAT 24H UR: 0.31 MG/MG CR (ref 0–0.2)
RBC # BLD AUTO: 4.09 10E6/UL (ref 3.8–5.2)
WBC # BLD AUTO: 6.2 10E3/UL (ref 4–11)

## 2022-10-03 PROCEDURE — 84460 ALANINE AMINO (ALT) (SGPT): CPT | Performed by: OBSTETRICS & GYNECOLOGY

## 2022-10-03 PROCEDURE — U0003 INFECTIOUS AGENT DETECTION BY NUCLEIC ACID (DNA OR RNA); SEVERE ACUTE RESPIRATORY SYNDROME CORONAVIRUS 2 (SARS-COV-2) (CORONAVIRUS DISEASE [COVID-19]), AMPLIFIED PROBE TECHNIQUE, MAKING USE OF HIGH THROUGHPUT TECHNOLOGIES AS DESCRIBED BY CMS-2020-01-R: HCPCS | Mod: ORL | Performed by: OBSTETRICS & GYNECOLOGY

## 2022-10-03 PROCEDURE — 84156 ASSAY OF PROTEIN URINE: CPT | Performed by: OBSTETRICS & GYNECOLOGY

## 2022-10-03 PROCEDURE — 84450 TRANSFERASE (AST) (SGOT): CPT | Performed by: OBSTETRICS & GYNECOLOGY

## 2022-10-03 PROCEDURE — 82565 ASSAY OF CREATININE: CPT | Performed by: OBSTETRICS & GYNECOLOGY

## 2022-10-03 PROCEDURE — 85025 COMPLETE CBC W/AUTO DIFF WBC: CPT | Performed by: OBSTETRICS & GYNECOLOGY

## 2022-10-04 LAB — SARS-COV-2 RNA RESP QL NAA+PROBE: NEGATIVE

## 2022-10-06 NOTE — ADDENDUM NOTE
Addended by: SALEEM DONG on: 9/4/2020 09:05 AM     Modules accepted: Orders     [Routine On-Treatment] : a routine on-treatment visit for [Other: ___] : [unfilled]

## 2022-10-09 ENCOUNTER — HEALTH MAINTENANCE LETTER (OUTPATIENT)
Age: 32
End: 2022-10-09

## 2022-10-19 NOTE — PROGRESS NOTES
Alka is a 32 year old who is being evaluated via a billable video visit.      How would you like to obtain your AVS? MyChart  If the video visit is dropped, the invitation should be resent by: Text to cell phone: [phone]  Will anyone else be joining your video visit? No          Ms. 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 -DELL, -CCP, -RF, -Ro. No erosions.    She is having problems with severe postpartum depression. She has help in the home and she is upping her depression. She sometimes can feel despondent but no suicidal ideation. She will awaken during the night frequently and she will sleep only 4 hours per night. Her Dad can help every other night. Her mother is in the house.  She is off of work and plans a February return. Delivery was 6 weeks ago. She is bottle feeding. She is in therapy and is having her mediations for mood adjusted.     Her joints are fine today. Poor sleep and she is fatigued. Joint function is stable.  She denies flaring of disease. Her pregnancy was complicated by bilateral carpal tunnel and pre-eclampsia now resolved. No GERD issues or dysphagia.     PMI:  Medical-asthma, juvenile arthritis, low back facet lumbar syndrome, chronic depression, eosinophilic esophagitis, vitamin D insufficiency, pre-eclampsia and postpartum HTN  Surgical-wisdom tooth extractions,   Injuries-toe fractures    FH:  GM with CHF  GM with colon cancer  Aunts, uncles with RA  Mother with OA, dysrhythmia, depression, fibromyalgia and ITP  Father with dysrhythmia, allergies and foot, eosinophil esophagitis  Sister is chronic anxiety  Son is healthy    SH:  Works as an , [employer]. , one son and two newborns. No tobacco, social EtOH, no illicit drugs. No exposures to infectious hepatitis or HIV. Right handed.     PMSF history personally obtained and updated by me in this clinic visit.    ROS:  +as above  +allergies to pets and seasonal  allergies  Remainder of the 14 point ROS obtained and found negative.    Physical Exam:  Constitutional: WD-WN-WG cooperative  Eyes: nl EOM, sclera  ENT: nl external ears, nose, hearing, lips; +modest parotid swelling  Neck: no visible thyroid enlargement  Resp: nl effort  MS: No arthritis appreciated  Skin: no alopecia, rash  Neuro: nl cranial nerves   Psych: nl judgement; affect tearful at times, but other times she appears bright    Laboratory:        important suggestion  View Detailed Reports      important suggestion  View Condensed Results Report      Contains abnormal data Hemoglobin  Order: [id number]   suggestion  Information displayed in this report may not trend or trigger automated decision support.      Ref Range & Units 1 mo ago   HEMOGLOBIN 12.0 - 16.0 gm/dL 8.8 Low     Specimen Collected: 10/07/22  8:35 AM Last Resulted: 10/07/22  8:55 AM   Received From: [facility]  Result Received: 11/18/22  7:26 AM     View Encounter      Received Information  Platelet Count  Order: [id number]   suggestion  Information displayed in this report may not trend or trigger automated decision support.      Ref Range & Units 1 mo ago   PLATELET COUNT 150 - 400 K/    Specimen Collected: 10/07/22  8:35 AM Last Resulted: 10/07/22  8:55 AM   Received From: [facility]  Result Received: 11/18/22  7:26 AM     View Encounter      Received Information  Creatinine / eGFR  Order: [id number]   suggestion  Information displayed in this report may not trend or trigger automated decision support.      Ref Range & Units 1 mo ago   CREATININE 0.55 - 1.02 mg/dL 0.60    EST GFR (CKD-EPI) >60.00 mL/min/1.73m2 >60.00    Comment: Calculation based on the Chronic Kidney Disease Epidemiology Collaboration (CKD-EPI) equation refit without adjustment for race.   Specimen Collected: 10/06/22  8:40 AM Last Resulted: 10/06/22 10:17 AM   Received From: [facility] [employer]  Result Received: 11/18/22  7:26 AM     View  Encounter      Received Information  AST  Order: [id number]   suggestion  Information displayed in this report may not trend or trigger automated decision support.      Ref Range & Units 1 mo ago   AST (SGOT) 15 - 37 U/L 29    Specimen Collected: 10/06/22  8:40 AM Last Resulted: 10/06/22  9:37 AM   Received From: [facility]  Result Received: 11/18/22  7:26 AM     View Encounter      Received Information  ALT  Order: [id number]   suggestion  Information displayed in this report may not trend or trigger automated decision support.      Ref Range & Units 1 mo ago   ALT 12 - 68 U/L 16    Specimen Collected: 10/06/22  8:40 AM Last Resulted: 10/06/22  9:37 AM   Received From: [facility]  Result Received: 11/18/22  7:26 AM     View Encounter      Received Information   Contains abnormal data CBC (Hgb,Hct,WBC,RBC,Platelet)  Order: [id number]   suggestion  Information displayed in this report may not trend or trigger automated decision support.      Ref Range & Units 1 mo ago   WBC 4.3 - 10.8 K/uL 6.4    RBC 4.20 - 5.40 M/uL 4.31    HEMOGLOBIN 12.0 - 16.0 gm/dL 11.0 Low     HEMATOCRIT 36.0 - 48.0 % 34.2 Low     MCV 80 - 100 fl 79 Low     MCH 27 - 33 pg 26 Low     MCHC 33 - 36 gm/dL 32 Low     RDW 11.5 - 14.5 % 21.2 High     PLATELET COUNT 150 - 400 K/    MPV 6.5 - 12 10.9    Specimen Collected: 10/06/22  8:40 AM Last Resulted: 10/06/22  9:28 AM   Received From: [facility]  Result Received: 11/18/22  7:26 AM     View Encounter      Received Information   Contains abnormal data Protein  random urine  Order: [id number]   Collected 10/3/2022 12:14 PM      0 Result Notes  Component Ref Range & Units 1 mo ago    Total Protein Urine mg/dL mg/dL 71.5    Comment: The reference ranges have not been established in urine protein. The results should be integrated into the clinical context for interpretation.   Total Protein UR MG/MG CR 0.00 - 0.20 mg/mg Cr 0.31 High     Creatinine Urine mg/dL mg/dL  232.0    Comment: The reference ranges have not been established in urine creatinine. The results should be integrated into the clinical context for interpretation.        View Full Report           Result Care Coordination      Patient Communication     Add Comments   Add Notifications  Back to Top               Impression:    Pauciarticular Chronic Juvenile Rheumatoid Arthritis-this remains quiescent postpartum, but high risk for flaring. No current immunosuppression and she has some apprehension about restarting hydroxychloroquine due to concern about long term side effects. We discuss in detail that treatment with hydroxychloroquine at this time is unlikely to affect her mood; however, its benefits may also take several months to be seen. Should she flare prednisone could be used here to bridge until hydroxychloroquine efficacy is achieved, but prednisone use risks complicating her care for depression. We agree to give consideration to a change away from hydroxychloroquine to an alternative DMARD in 6-12 months after her depression has resolved. At this time we agree to restart the hydoxychloroquine 400 mg daily. Plan to get inflammatory markers in 2 months     Eosinophilic esophagitis-stable.    Long term management of immunosuppression-with risk for worsening depression with use of prednisone. Get toxicity screening in 2 months and eye evaluation with the next 6 months.     Follow up in 3 months.      A total of 34 minutes was spent in face to face patient interaction and chart review on the day of service.          Video-Visit Details    Video Start Time: 11:46 PM    Type of service:  Video Visit    Video End Time:12:13 PM    Originating Location (pt. Location): Home        Distant Location (provider location):  Off-site    Platform used for Video Visit: Origin[employer]

## 2022-10-20 ENCOUNTER — TELEPHONE (OUTPATIENT)
Dept: GASTROENTEROLOGY | Facility: CLINIC | Age: 32
End: 2022-10-20

## 2022-10-20 NOTE — TELEPHONE ENCOUNTER
Left Voicemail (2nd Attempt) for the patient to call back and schedule the following:    Appointment type: return  Provider: Jose  Return date: 7/12/2023  Specialty phone number: [phone]  Additional appointment(s) needed: no  Additonal Notes: no    Fely Wheeler  Procedure    Cardiology, Rheumatology, GI, Pulmonology, Nephrology Specialties   [facility]  [phone]

## 2022-11-17 ENCOUNTER — PATIENT OUTREACH (OUTPATIENT)
Dept: CARE COORDINATION | Facility: CLINIC | Age: 32
End: 2022-11-17

## 2022-11-18 ENCOUNTER — VIRTUAL VISIT (OUTPATIENT)
Dept: RHEUMATOLOGY | Facility: CLINIC | Age: 32
End: 2022-11-18
Payer: COMMERCIAL

## 2022-11-18 DIAGNOSIS — Z79.60 LONG-TERM USE OF IMMUNOSUPPRESSANT MEDICATION: ICD-10-CM

## 2022-11-18 DIAGNOSIS — M08.00 JUVENILE RHEUMATOID ARTHRITIS (H): ICD-10-CM

## 2022-11-18 DIAGNOSIS — K20.0 EOSINOPHILIC ESOPHAGITIS: ICD-10-CM

## 2022-11-18 PROCEDURE — 99214 OFFICE O/P EST MOD 30 MIN: CPT | Mod: 95 | Performed by: INTERNAL MEDICINE

## 2022-11-18 RX ORDER — HYDROXYCHLOROQUINE SULFATE 200 MG/1
400 TABLET, FILM COATED ORAL DAILY
Qty: 180 TABLET | Refills: 0 | Status: SHIPPED | OUTPATIENT
Start: 2022-11-18 | End: 2023-02-14

## 2022-11-18 NOTE — PATIENT INSTRUCTIONS
Get laboratory testing done in about 2 months  Get an eye evaluation some time in the next 6 months  Start hydroxychloroquine 400 mg daily now  Follow up with me in 3 months

## 2022-11-22 ENCOUNTER — TELEPHONE (OUTPATIENT)
Dept: RHEUMATOLOGY | Facility: CLINIC | Age: 32
End: 2022-11-22

## 2022-11-22 ENCOUNTER — PATIENT OUTREACH (OUTPATIENT)
Dept: CARE COORDINATION | Facility: CLINIC | Age: 32
End: 2022-11-22

## 2022-11-22 NOTE — TELEPHONE ENCOUNTER
Left Voicemail (1st Attempt) for the patient to call back and schedule the following:      Specialty phone number: [phone]   Additonal Notes: labs and eye appointment     Laura cummings Procedure   Orthopedics, Podiatry, Sports Medicine, Ent ,Eye , Audiology, Adult Endocrine & Diabetes, Nutrition & Medication Therapy Management Specialties   [facility]

## 2023-01-24 NOTE — PROGRESS NOTES
Alka is a 32 year old who is being evaluated via a billable video visit.      How would you like to obtain your AVS? MyChart  If the video visit is dropped, the invitation should be resent by: Text to cell phone: [phone]  Will anyone else be joining your video visit? No        Video-Visit Details    Type of service:  Video Visit     Video start time: 8:17 AM    Video end time: 8:41 AM    Originating Location (pt. Location): Home    Distant Location (provider location):  On-site  Platform used for Video Visit: Willow      Ms. 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 -DELL, -CCP, -RF, -Ro. No erosions.    She is having problems with severe postpartum depression. She has help in the home and she is upping her depression. She sometimes can feel despondent but no suicidal ideation. She will awaken during the night frequently and she will sleep only 4 hours per night. Her Dad can help every other night. Her mother is in the house.  She is off of work and plans a February return. Delivery was 6 weeks ago. She is bottle feeding. She is in therapy and is having her mediations for mood adjusted.     Mentally she is better now with some medication and therapy. Sleep is still interrupted every 2 months. Sleep training is doing better.     She is now back on hydroxychloroquine since November. No major flares of joint disease recently. No swelling, redness, warmth or swelling. Her wrists are overused with the newborns but this is not too much at present. No analgesic use. No AM stiffness. No dysfunction or deformity. She has some difficulty getting up off the floor. She admits to deconditioning.     She needs her eyes checked again. Minor flaring of esophagitis since her pregnancy.    PMI:  Medical-asthma, juvenile arthritis, low back facet lumbar syndrome, chronic depression, eosinophilic esophagitis, vitamin D insufficiency, pre-eclampsia and postpartum HTN  Surgical-wisdom tooth  extractions,   Injuries-toe fractures    FH:  GM with CHF  GM with colon cancer  Aunts, uncles with RA  Mother with OA, dysrhythmia, depression, fibromyalgia and ITP  Father with dysrhythmia, allergies and foot, eosinophil esophagitis  Sister is chronic anxiety  Son is healthy    SH:  Works as an , [employer]. , one son and two newborns. No tobacco, social EtOH, no illicit drugs. No exposures to infectious hepatitis or HIV. Right handed.     PMSF history personally obtained and updated by me in this clinic visit.    ROS:  +hearing issues  +allergies to pets and seasonal allergies  Remainder of the 14 point ROS obtained and found negative.    Physical Exam:  Constitutional: WD-WN-WG cooperative  Eyes: nl EOM, sclera  ENT: nl external ears, nose, hearing, lips; +modest parotid swelling  Neck: no visible thyroid enlargement  Resp: nl effort  MS: No arthritis appreciated  Skin: no alopecia, rash  Neuro: nl cranial nerves   Psych: nl judgement; affect tearful at times, but other times she appears bright    Laboratory:        important suggestion  View Detailed Reports      important suggestion  View Condensed Results Report      Contains abnormal data Hemoglobin  Order: [id number]   suggestion  Information displayed in this report may not trend or trigger automated decision support.      Ref Range & Units 1 mo ago   HEMOGLOBIN 12.0 - 16.0 gm/dL 8.8 Low     Specimen Collected: 10/07/22  8:35 AM Last Resulted: 10/07/22  8:55 AM   Received From: [facility]  Result Received: 22  7:26 AM     View Encounter      Received Information  Platelet Count  Order: [id number]   suggestion  Information displayed in this report may not trend or trigger automated decision support.      Ref Range & Units 1 mo ago   PLATELET COUNT 150 - 400 K/    Specimen Collected: 10/07/22  8:35 AM Last Resulted: 10/07/22  8:55 AM   Received From: [facility]  Result Received: 22  7:26 AM     View  Encounter      Received Information  Creatinine / eGFR  Order: [id number]   suggestion  Information displayed in this report may not trend or trigger automated decision support.      Ref Range & Units 1 mo ago   CREATININE 0.55 - 1.02 mg/dL 0.60    EST GFR (CKD-EPI) >60.00 mL/min/1.73m2 >60.00    Comment: Calculation based on the Chronic Kidney Disease Epidemiology Collaboration (CKD-EPI) equation refit without adjustment for race.   Specimen Collected: 10/06/22  8:40 AM Last Resulted: 10/06/22 10:17 AM   Received From: [facility]  Result Received: 11/18/22  7:26 AM     View Encounter      Received Information  AST  Order: [id number]   suggestion  Information displayed in this report may not trend or trigger automated decision support.      Ref Range & Units 1 mo ago   AST (SGOT) 15 - 37 U/L 29    Specimen Collected: 10/06/22  8:40 AM Last Resulted: 10/06/22  9:37 AM   Received From: [facility]  Result Received: 11/18/22  7:26 AM     View Encounter      Received Information  ALT  Order: [id number]   suggestion  Information displayed in this report may not trend or trigger automated decision support.      Ref Range & Units 1 mo ago   ALT 12 - 68 U/L 16    Specimen Collected: 10/06/22  8:40 AM Last Resulted: 10/06/22  9:37 AM   Received From: [facility]  Result Received: 11/18/22  7:26 AM     View Encounter      Received Information   Contains abnormal data CBC (Hgb,Hct,WBC,RBC,Platelet)  Order: [id number]   suggestion  Information displayed in this report may not trend or trigger automated decision support.      Ref Range & Units 1 mo ago   WBC 4.3 - 10.8 K/uL 6.4    RBC 4.20 - 5.40 M/uL 4.31    HEMOGLOBIN 12.0 - 16.0 gm/dL 11.0 Low     HEMATOCRIT 36.0 - 48.0 % 34.2 Low     MCV 80 - 100 fl 79 Low     MCH 27 - 33 pg 26 Low     MCHC 33 - 36 gm/dL 32 Low     RDW 11.5 - 14.5 % 21.2 High     PLATELET COUNT 150 - 400 K/    MPV 6.5 - 12 10.9    Specimen Collected: 10/06/22  8:40 AM Last  Resulted: 10/06/22  9:28 AM   Received From: [facility]  Result Received: 11/18/22  7:26 AM     View Encounter      Received Information   Contains abnormal data Protein  random urine  Order: [id number]   Collected 10/3/2022 12:14 PM      0 Result Notes  Component Ref Range & Units 1 mo ago    Total Protein Urine mg/dL mg/dL 71.5    Comment: The reference ranges have not been established in urine protein. The results should be integrated into the clinical context for interpretation.   Total Protein UR MG/MG CR 0.00 - 0.20 mg/mg Cr 0.31 High     Creatinine Urine mg/dL mg/dL 232.0    Comment: The reference ranges have not been established in urine creatinine. The results should be integrated into the clinical context for interpretation.        View Full Report           Result Care Coordination      Patient Communication     Add Comments   Add Notifications  Back to Top               Impression:    Pauciarticular Chronic Juvenile Rheumatoid Arthritis-now well controlled with only hydroxychloroquine. I don't appreciate any uncontrolled systemic inflammatory disease or synovitis. She tolerates the hydroxychloroquine well and we will continue this. Get lab testing now for inflammatory markers.    Eosinophilic esophagitis-only mildly exacerbated postpartum and she will be seeing her gastroenterologist.    Continued hearing issues-get repeat audiology evaluation.    Long term management of immunosuppression-with no overt side effects. Get toxicity screening now and eye evaluation with the next 3 months.     Follow up in 4 months.      A total of 21 minutes was spent in face to face patient interaction and chart review on the day of service.          Video-Visit Details    Video Start Time: 11:46 PM    Type of service:  Video Visit    Video End Time:12:13 PM    Originating Location (pt. Location): Home        Distant Location (provider location):  Off-site    Platform used for Video Visit: Willow

## 2023-02-10 DIAGNOSIS — Z79.60 LONG-TERM USE OF IMMUNOSUPPRESSANT MEDICATION: ICD-10-CM

## 2023-02-10 DIAGNOSIS — K20.0 EOSINOPHILIC ESOPHAGITIS: ICD-10-CM

## 2023-02-10 DIAGNOSIS — M08.00 JUVENILE RHEUMATOID ARTHRITIS (H): ICD-10-CM

## 2023-02-14 NOTE — TELEPHONE ENCOUNTER
Medication/Dose: hydroxychloroquine (PLAQUENIL) 200 MG tablet    Last Written : 11/18/22  Last Quantity: 180, # refills: 0  Last Office Visit :  11/18/22  Pending appointment: 2/17/23     Last Eye Exam: 11/12/21 (scanned under media tab)  Pending Eye exam: Unknown (not scheduled thru [facility])    Prescription did not meet protocol, and routed to provider. It has been greater than 1 year since last eye exam.     CHARLES RuanoN, RN  ealth Refill Team

## 2023-02-15 RX ORDER — HYDROXYCHLOROQUINE SULFATE 200 MG/1
400 TABLET, FILM COATED ORAL DAILY
Qty: 180 TABLET | Refills: 0 | Status: SHIPPED | OUTPATIENT
Start: 2023-02-15 | End: 2023-02-17

## 2023-02-17 ENCOUNTER — VIRTUAL VISIT (OUTPATIENT)
Dept: RHEUMATOLOGY | Facility: CLINIC | Age: 33
End: 2023-02-17
Payer: COMMERCIAL

## 2023-02-17 DIAGNOSIS — K20.0 EOSINOPHILIC ESOPHAGITIS: ICD-10-CM

## 2023-02-17 DIAGNOSIS — Z79.60 LONG-TERM USE OF IMMUNOSUPPRESSANT MEDICATION: ICD-10-CM

## 2023-02-17 DIAGNOSIS — M08.00 JUVENILE RHEUMATOID ARTHRITIS (H): ICD-10-CM

## 2023-02-17 DIAGNOSIS — H91.90 HEARING DISORDER, UNSPECIFIED LATERALITY: ICD-10-CM

## 2023-02-17 DIAGNOSIS — M08.00 JUVENILE RHEUMATOID ARTHRITIS (H): Primary | ICD-10-CM

## 2023-02-17 PROCEDURE — 99214 OFFICE O/P EST MOD 30 MIN: CPT | Mod: VID | Performed by: INTERNAL MEDICINE

## 2023-02-17 RX ORDER — HYDROXYCHLOROQUINE SULFATE 200 MG/1
400 TABLET, FILM COATED ORAL DAILY
Qty: 180 TABLET | Refills: 3 | Status: SHIPPED | OUTPATIENT
Start: 2023-02-17 | End: 2023-06-16

## 2023-02-17 NOTE — PATIENT INSTRUCTIONS
Continue the hydroxychloroquine  Get your eyes and ears checked  Get lab testing now  Follow up in 4 months

## 2023-05-01 ENCOUNTER — TELEPHONE (OUTPATIENT)
Dept: OPHTHALMOLOGY | Facility: CLINIC | Age: 33
End: 2023-05-01

## 2023-05-01 ENCOUNTER — OFFICE VISIT (OUTPATIENT)
Dept: OPHTHALMOLOGY | Facility: CLINIC | Age: 33
End: 2023-05-01
Attending: INTERNAL MEDICINE
Payer: COMMERCIAL

## 2023-05-01 DIAGNOSIS — D31.32 CHOROIDAL NEVUS OF LEFT EYE: ICD-10-CM

## 2023-05-01 DIAGNOSIS — Z79.60 LONG-TERM USE OF IMMUNOSUPPRESSANT MEDICATION: Primary | ICD-10-CM

## 2023-05-01 DIAGNOSIS — M08.00 JUVENILE RHEUMATOID ARTHRITIS (H): ICD-10-CM

## 2023-05-01 PROCEDURE — 92004 COMPRE OPH EXAM NEW PT 1/>: CPT | Performed by: OPHTHALMOLOGY

## 2023-05-01 PROCEDURE — 92083 EXTENDED VISUAL FIELD XM: CPT | Performed by: OPHTHALMOLOGY

## 2023-05-01 PROCEDURE — 92015 DETERMINE REFRACTIVE STATE: CPT | Performed by: OPHTHALMOLOGY

## 2023-05-01 PROCEDURE — 92134 CPTRZ OPH DX IMG PST SGM RTA: CPT | Performed by: OPHTHALMOLOGY

## 2023-05-01 ASSESSMENT — CONF VISUAL FIELD
OS_SUPERIOR_TEMPORAL_RESTRICTION: 0
OD_INFERIOR_TEMPORAL_RESTRICTION: 0
OD_NORMAL: 1
OD_INFERIOR_NASAL_RESTRICTION: 0
OS_INFERIOR_NASAL_RESTRICTION: 0
OS_NORMAL: 1
METHOD: COUNTING FINGERS
OD_SUPERIOR_NASAL_RESTRICTION: 0
OS_INFERIOR_TEMPORAL_RESTRICTION: 0
OD_SUPERIOR_TEMPORAL_RESTRICTION: 0
OS_SUPERIOR_NASAL_RESTRICTION: 0

## 2023-05-01 ASSESSMENT — EXTERNAL EXAM - LEFT EYE: OS_EXAM: NORMAL

## 2023-05-01 ASSESSMENT — VISUAL ACUITY
OS_SC: 20/20
OD_SC: 20/20
METHOD: SNELLEN - LINEAR

## 2023-05-01 ASSESSMENT — TONOMETRY
OS_IOP_MMHG: 17
IOP_METHOD: TONOPEN
OD_IOP_MMHG: 19

## 2023-05-01 ASSESSMENT — REFRACTION_MANIFEST
OD_SPHERE: -0.25
OS_SPHERE: -0.25
OD_CYLINDER: SPHERE
OS_CYLINDER: SPHERE

## 2023-05-01 ASSESSMENT — EXTERNAL EXAM - RIGHT EYE: OD_EXAM: NORMAL

## 2023-05-01 ASSESSMENT — SLIT LAMP EXAM - LIDS
COMMENTS: NORMAL
COMMENTS: NORMAL

## 2023-05-01 ASSESSMENT — CUP TO DISC RATIO
OS_RATIO: 0.4
OD_RATIO: 0.5

## 2023-05-01 NOTE — NURSING NOTE
Chief Complaints and History of Present Illnesses   Patient presents with     Follow Tx Of High Risk Med     Chief Complaint(s) and History of Present Illness(es)     Follow Tx Of High Risk Med            Laterality: both eyes    Course: stable    Associated symptoms: dryness.  Negative for eye pain, flashes and floaters    Treatments tried: no treatments          Comments    Here for plaquenil exam. Vision is doing well and stable since last eye exam. Notes some dryness. Denies using eye drops. No eye pain. No flashes or floaters.    On plaquenil x4 years - stopped during pregnancy and restarted 4 months ago.   Plaquenil 400mg daily.    German Dao COT 1:12 PM May 1, 2023

## 2023-05-01 NOTE — TELEPHONE ENCOUNTER
Left Voicemail (1st Attempt) for the patient to call back and schedule the following:    Appointment type: return  Provider:dr. coyle  Return date: 5/1/2024  Specialty phone number: [phone]   Additonal Notes: Return in about 1 year (around 5/1/2024) for Annual Visit, 10-2 VF, OCT Macula, Fundus Autofluorescence, color plates.    Laura cummings Procedure   Orthopedics, Podiatry, Sports Medicine, Ent ,Eye , Audiology, Adult Endocrine & Diabetes, Nutrition & Medication Therapy Management Specialties   [facility]

## 2023-05-01 NOTE — PROGRESS NOTES
HPI     Follow Tx Of High Risk Med    In both eyes.  Since onset it is stable.  Associated symptoms include dryness.  Negative for eye pain, flashes and floaters.  Treatments tried include no treatments.           Comments    Here for plaquenil exam. Vision is doing well and stable since last eye exam. Notes some dryness. Denies using eye drops. No eye pain. No flashes or floaters.    On plaquenil x4 years - stopped during pregnancy and restarted 4 months ago.   Plaquenil 400mg daily.    German BORDEN 1:12 PM May 1, 2023             Last edited by German Dao on 5/1/2023  1:16 PM.         Review of systems for the eyes was negative other than the pertinent positives/negatives listed in the HPI.      Assessment & Plan    HPI:  Alka Casey is a 32 year old female with history of juvenile idiopathic arthritis here for annual plaquenil screening. She had twin boys 6 months ago and restarted plaquenil after pregnancy. No acute complaints today      POHx: denies  PMHx: juvenile idiopathic arthritis , allergies  Current Medications: buPROPion HCl (WELLBUTRIN PO), Take 100 mg by mouth  cetirizine (ZYRTEC) 10 MG tablet, cetirizine 10 mg tablet   TAKE 1 TABLET BY MOUTH EVERY DAY  hydroxychloroquine (PLAQUENIL) 200 MG tablet, Take 2 tablets (400 mg) by mouth daily Annual Plaquenil toxicity eye screening required, please schedule this now. Thank you  levalbuterol (XOPENEX HFA) 45 MCG/ACT Inhaler, Inhale 2 puffs into the lungs every 4 hours as needed for asthma symptoms.  montelukast (SINGULAIR) 10 MG tablet, Take 1 tablet (10 mg) by mouth daily for asthma prevention.  sertraline (ZOLOFT) 100 MG tablet, Take 1 tablet (100 mg) by mouth daily (Patient taking differently: Take 200 mg by mouth daily)    No current facility-administered medications on file prior to visit.    FHx: refractive error  PSHx: denies history of ocular surgeries       Current Eye Medications:      Assessment & Plan:  (Z79.60) Long-term use of immunosuppressant  medication  (primary encounter diagnosis)  (M08.00) Juvenile rheumatoid arthritis (H)  Screening of ocular sequelae with Plaquenil use  Normal dilated fundus exam  Started plaquenil 2014, has been intermittently off during pregnancies  Current dose 400mg  Current weight 90kg    We discussed at length the low risk of toxicity in patient with <7 years of treatment (0.33%) which increases to 2.1% at 15 years.    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        Luc Sawyer MD     Attending Physician Attestation:  Complete documentation of historical and exam elements from today's encounter can be found in the full encounter summary report (not reduplicated in this progress note).  I personally obtained the chief complaint(s) and history of present illness.  I confirmed and edited as necessary the review of systems, past medical/surgical history, family history, social history, and examination findings as documented by others; and I examined the patient myself.  I personally reviewed the relevant tests, images, and reports as documented above.  I formulated and edited as necessary the assessment and plan and discussed the findings and management plan with the patient and family. - Luc Sawyer MD

## 2023-06-12 NOTE — PROGRESS NOTES
Alka is a 32 year old who is being evaluated via a billable video visit.      How would you like to obtain your AVS? MyChart  If the video visit is dropped, the invitation should be resent by: Text to cell phone: [phone]  Will anyone else be joining your video visit? No        Video-Visit Details    Type of service:  Video Visit     Originating Location (pt. Location): Home    Distant Location (provider location):  Off-site  Platform used for Video Visit: Willow             Ms. 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 -DELL, -CCP, -RF, -Ro. No erosions.    She feels that she had a flare of low back pain, ankle swelling, that lasted a few days with resolution using advil and ice. Today she is near normal. She has chronic upper and mid back pain that she can feel today. She is exercising to combat this type of pain.  She denies redness or warmth. Good joint function. Her right ankle can swell. Her sleep is now good and no AM stiffness.     She is back on hydroxychloroquine 400 mg with good tolerance and normal eye evaluation.     PMI:  Medical-asthma, juvenile arthritis, low back facet lumbar syndrome, chronic depression, eosinophilic esophagitis, vitamin D insufficiency, pre-eclampsia and postpartum HTN, postpartum depression  Surgical-wisdom tooth extractions,   Injuries-toe fractures    FH:  GM with CHF  GM with colon cancer  Aunts, uncles with RA  Mother with OA, dysrhythmia, depression, fibromyalgia and ITP  Father with dysrhythmia, allergies and foot, eosinophil esophagitis s/p surgical intervention  Sister is chronic anxiety  Son is healthy    SH:  Works as an , [employer]. , one son and two newborns. No tobacco, social EtOH, no illicit drugs. No exposures to infectious hepatitis or HIV. Right handed.     PMSF history personally obtained and updated by me in this clinic visit.    ROS:  +struggles with weight  +swallowing issues  +allergies to pets and  seasonal allergies  Remainder of the 14 point ROS obtained and found negative.    Physical Exam:  Constitutional: WD-WN-WG cooperative; +obesity  Eyes: nl EOM, sclera  ENT: nl external ears, nose, hearing, lips; +modest parotid swelling  Neck: no visible thyroid enlargement  Resp: nl effort  MS: No active arthritis appreciated; all neck, shoulder, elbow, wrist, and hand joints were examined and found normal. Normal tuck and prayer. FROM.  Skin: no alopecia, rash  Neuro: nl cranial nerves   Psych: nl judgement and affect    Laboratory:    None currently    Impression:    Pauciarticular Chronic Juvenile Rheumatoid Arthritis-this is treated to near target with hydroxychloroquine. Good tolerance of the medication and we will continue this. Plan to get the inflammation marker. Defer Xrays until next visit.    Eosinophilic esophagitis-very stable symptoms, undergoing further evaluation.     Long term management of immunosuppression-with normal eye evaluation. Plan to repeat this yearly and get toxicity screening now and every 6 months.     Follow up in 4-6 months.      A total of 26 minutes was spent in face to face patient interaction and chart review on the day of service.          Video-Visit Details    Video Start Time: 10:50 AM    Type of service:  Video Visit    Video End Time: 11:07

## 2023-06-16 ENCOUNTER — VIRTUAL VISIT (OUTPATIENT)
Dept: RHEUMATOLOGY | Facility: CLINIC | Age: 33
End: 2023-06-16
Payer: COMMERCIAL

## 2023-06-16 DIAGNOSIS — Z79.60 LONG-TERM USE OF IMMUNOSUPPRESSANT MEDICATION: ICD-10-CM

## 2023-06-16 DIAGNOSIS — M08.00 JUVENILE RHEUMATOID ARTHRITIS (H): ICD-10-CM

## 2023-06-16 DIAGNOSIS — K20.0 EOSINOPHILIC ESOPHAGITIS: ICD-10-CM

## 2023-06-16 PROCEDURE — 99214 OFFICE O/P EST MOD 30 MIN: CPT | Mod: VID | Performed by: INTERNAL MEDICINE

## 2023-06-16 RX ORDER — HYDROXYCHLOROQUINE SULFATE 200 MG/1
400 TABLET, FILM COATED ORAL DAILY
Qty: 180 TABLET | Refills: 3 | Status: SHIPPED | OUTPATIENT
Start: 2023-06-16 | End: 2024-01-26

## 2023-10-28 ENCOUNTER — HEALTH MAINTENANCE LETTER (OUTPATIENT)
Age: 33
End: 2023-10-28

## 2024-01-05 ENCOUNTER — LAB REQUISITION (OUTPATIENT)
Dept: LAB | Facility: CLINIC | Age: 34
End: 2024-01-05

## 2024-01-05 DIAGNOSIS — N92.0 EXCESSIVE AND FREQUENT MENSTRUATION WITH REGULAR CYCLE: ICD-10-CM

## 2024-01-05 LAB — TSH SERPL DL<=0.005 MIU/L-ACNC: 2.37 UIU/ML (ref 0.3–4.2)

## 2024-01-05 PROCEDURE — 84443 ASSAY THYROID STIM HORMONE: CPT | Performed by: OBSTETRICS & GYNECOLOGY

## 2024-01-05 PROCEDURE — 88305 TISSUE EXAM BY PATHOLOGIST: CPT | Performed by: PATHOLOGY

## 2024-01-09 LAB
PATH REPORT.COMMENTS IMP SPEC: NORMAL
PATH REPORT.COMMENTS IMP SPEC: NORMAL
PATH REPORT.FINAL DX SPEC: NORMAL
PATH REPORT.GROSS SPEC: NORMAL
PATH REPORT.MICROSCOPIC SPEC OTHER STN: NORMAL
PATH REPORT.RELEVANT HX SPEC: NORMAL
PHOTO IMAGE: NORMAL

## 2024-01-25 NOTE — PROGRESS NOTES
Alka is a 33 year old who is being evaluated via a billable video visit.      How would you like to obtain your AVS? MyChart  If the video visit is dropped, the invitation should be resent by: Text to cell phone: [phone]  Will anyone else be joining your video visit? No        Video-Visit Details    Type of service:  Video Visit     Originating Location (pt. Location): Home    Distant Location (provider location):  Off-site  Platform used for Video Visit: Willow          Ms. 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 -DELL, -CCP, -RF, -Ro. No erosions.    Her joints are generally quite good. She has some trouble getting up off the ground. She does have fatigue as well and this limits her activity. She is walking her dog for exercise. No deformities to report. Perhaps some swelling in the fingers. No redness or warmth. She also reports intermittent swelling in the right ankle. She denies trauma to the ankle. She will go to the chiropractor. No AM stiffness. No important insomnia.     No important sicca. She intermittent dysphagia. She is considering a procedure to improve her LES function.     Hydroxychloroquine 400 mg daily with normal eye evaluation.    PMI:  Medical-asthma, juvenile arthritis, low back facet lumbar syndrome, chronic depression, eosinophilic esophagitis, vitamin D insufficiency, pre-eclampsia and postpartum HTN, postpartum depression  Surgical-wisdom tooth extractions,   Injuries-toe fractures    FH:  GM with CHF  GM with colon cancer  Aunts, uncles with RA  Mother with OA, dysrhythmia, depression, fibromyalgia and ITP  Father with dysrhythmia, allergies and foot, eosinophil esophagitis s/p surgical intervention  Sister is chronic anxiety  Son is healthy    SH:  Works as an , [employer].  with 3 children. No tobacco, social EtOH, no illicit drugs. No exposures to infectious hepatitis or HIV. Right handed.     PMSF history personally obtained  and updated by me in this clinic visit.    ROS:  +mood improved but depression and anxiety still a struggle  +allergies to pets and seasonal allergies  Remainder of the 14 point ROS obtained and found negative.    Physical Exam:  Constitutional: WD-WN-WG cooperative; +obesity  Eyes: nl EOM, sclera  ENT: nl external ears, nose, hearing, lips; +modest parotid swelling  Resp: nl effort  MS: No active arthritis appreciated; all neck, shoulder, elbow, wrist, and hand joints were examined and found normal. Normal tuck and prayer. FROM. Hypermobile elbows  Skin: no alopecia, rash  Neuro: nl cranial nerves   Psych: nl judgement and affect    Laboratory:        important suggestion  View Detailed Reports      important suggestion  View Condensed Results Report      Contains abnormal data CBC (Hgb,Hct,WBC,RBC,Platelet)  Order: [id number]   suggestion  Information displayed in this report may not trend or trigger automated decision support.     Component  Ref Range & Units 1 yr ago   WBC  4.3 - 10.8 K/uL 6.4   RBC  4.20 - 5.40 M/uL 4.31   HEMOGLOBIN  12.0 - 16.0 gm/dL 11.0 Low    HEMATOCRIT  36.0 - 48.0 % 34.2 Low    MCV  80 - 100 fl 79 Low    MCH  27 - 33 pg 26 Low    MCHC  33 - 36 gm/dL 32 Low    RDW  11.5 - 14.5 % 21.2 High    PLATELET COUNT  150 - 400 K/   MPV  6.5 - 12 10.9     Specimen Collected: 10/06/22  8:40 AM Last Resulted: 10/06/22  9:28 AM   Received From: [facility]  Result Received: 11/18/22  7:26 AM    View Encounter        Received Information  ALT  Order: [id number]   suggestion  Information displayed in this report may not trend or trigger automated decision support.     Component  Ref Range & Units 1 yr ago   ALT  12 - 68 U/L 16     Specimen Collected: 10/06/22  8:40 AM Last Resulted: 10/06/22  9:37 AM   Received From: [facility]  Result Received: 11/18/22  7:26 AM    View Encounter        Received Information  Creatinine / eGFR  Order: [id number]   suggestion  Information displayed  in this report may not trend or trigger automated decision support.     Component  Ref Range & Units 1 yr ago   CREATININE  0.55 - 1.02 mg/dL 0.60   EST GFR (CKD-EPI)  >60.00 mL/min/1.73m2 >60.00   Comment: Calculation based on the Chronic Kidney Disease Epidemiology Collaboration (CKD-EPI) equation refit without adjustment for race.     Specimen Collected: 10/06/22  8:40 AM Last Resulted: 10/06/22 10:17 AM   Received From: [facility]  Result Received: 11/18/22  7:26 AM    View Encounter        Received Information  AST  Order: [id number]   suggestion  Information displayed in this report may not trend or trigger automated decision support.     Component  Ref Range & Units 1 yr ago   AST (SGOT)  15 - 37 U/L 29     Specimen Collected: 10/06/22  8:40 AM Last Resulted: 10/06/22  9:37 AM   Received From: [facility]  Result Received: 11/18/22  7:26 AM    View Encounter        Received Information    Impression:    Pauciarticular Chronic Juvenile Rheumatoid Arthritis-with continued good joint health and no appreciated active synovitis or systemic inflammatory disease. She continues to tolerate the hydroxychloroquine well and we will continue this. Follow the CRP.     Eosinophilic esophagitis-with stable dysphagia.    Long term management of immunosuppression-get toxicity screening labs now and plan for repeat eye evaluation in May.    Follow up in 6 months.      A total of 32 minutes was spent in face to face patient interaction and chart review on the day of service.          Video-Visit Details    Video Start Time: 8:16 AM    Type of service:  Video Visit    Video End Time: 8:46 AM

## 2024-01-26 ENCOUNTER — VIRTUAL VISIT (OUTPATIENT)
Dept: RHEUMATOLOGY | Facility: CLINIC | Age: 34
End: 2024-01-26
Payer: COMMERCIAL

## 2024-01-26 DIAGNOSIS — M08.00 JUVENILE RHEUMATOID ARTHRITIS (H): ICD-10-CM

## 2024-01-26 DIAGNOSIS — Z79.60 LONG-TERM USE OF IMMUNOSUPPRESSANT MEDICATION: ICD-10-CM

## 2024-01-26 DIAGNOSIS — K20.0 EOSINOPHILIC ESOPHAGITIS: ICD-10-CM

## 2024-01-26 PROCEDURE — 99214 OFFICE O/P EST MOD 30 MIN: CPT | Mod: 95 | Performed by: INTERNAL MEDICINE

## 2024-01-26 RX ORDER — BUPROPION HYDROCHLORIDE 300 MG/1
300 TABLET ORAL EVERY MORNING
COMMUNITY
Start: 2023-12-14

## 2024-01-26 RX ORDER — HYDROXYCHLOROQUINE SULFATE 200 MG/1
400 TABLET, FILM COATED ORAL DAILY
Qty: 180 TABLET | Refills: 3 | Status: SHIPPED | OUTPATIENT
Start: 2024-01-26

## 2024-01-26 NOTE — PATIENT INSTRUCTIONS
Continue the hydroxychloroquine with yearly eye evaluation (due in May)  Get lab testing now and every 6 months  Follow up in 6 months

## 2024-01-29 ENCOUNTER — TELEPHONE (OUTPATIENT)
Dept: RHEUMATOLOGY | Facility: CLINIC | Age: 34
End: 2024-01-29
Payer: COMMERCIAL

## 2024-01-29 ENCOUNTER — MYC MEDICAL ADVICE (OUTPATIENT)
Dept: PEDIATRICS | Facility: CLINIC | Age: 34
End: 2024-01-29
Payer: COMMERCIAL

## 2024-01-29 NOTE — CONFIDENTIAL NOTE
Left Voicemail (1st Attempt) for the patient to call back and schedule the following:    Appointment type: return   Provider:   Return date: 07/26/2024  Specialty phone number: [phone]  Additional appointment(s) needed: schedule lab testing  Additonal Notes: follow up in 6 months.      [facility]   Gastroenterology, Infectious Diseases, Nephrology, Pulmonology and Rheumatology Specialties  [facility]

## 2024-02-02 ENCOUNTER — TELEPHONE (OUTPATIENT)
Dept: RHEUMATOLOGY | Facility: CLINIC | Age: 34
End: 2024-02-02
Payer: COMMERCIAL

## 2024-02-02 NOTE — CONFIDENTIAL NOTE
Left Voicemail (2nd Attempt) for the patient to call back and schedule the following:    Appointment type: return rheumatology  Provider:   Return date: 07/26/2024  Specialty phone number: [phone]  Additional appointment(s) needed: schedule lab testing   Additonal Notes: follow up in 6 months, around 07/26/2024.      [facility]   Gastroenterology, Infectious Diseases, Nephrology, Pulmonology and Rheumatology Specialties  [facility]

## 2024-04-15 ENCOUNTER — LAB (OUTPATIENT)
Dept: LAB | Facility: CLINIC | Age: 34
End: 2024-04-15
Payer: COMMERCIAL

## 2024-04-15 DIAGNOSIS — K20.0 EOSINOPHILIC ESOPHAGITIS: ICD-10-CM

## 2024-04-15 DIAGNOSIS — M08.00 JUVENILE RHEUMATOID ARTHRITIS (H): ICD-10-CM

## 2024-04-15 DIAGNOSIS — Z79.60 LONG-TERM USE OF IMMUNOSUPPRESSANT MEDICATION: ICD-10-CM

## 2024-04-15 LAB
ALBUMIN SERPL BCG-MCNC: 4.6 G/DL (ref 3.5–5.2)
ALBUMIN UR-MCNC: 20 MG/DL
ALT SERPL W P-5'-P-CCNC: 15 U/L (ref 0–50)
APPEARANCE UR: CLEAR
AST SERPL W P-5'-P-CCNC: 26 U/L (ref 0–45)
BACTERIA #/AREA URNS HPF: ABNORMAL /HPF
BILIRUB UR QL STRIP: NEGATIVE
COLOR UR AUTO: ABNORMAL
CREAT SERPL-MCNC: 0.72 MG/DL (ref 0.51–0.95)
CRP SERPL-MCNC: 17.5 MG/L
EGFRCR SERPLBLD CKD-EPI 2021: >90 ML/MIN/1.73M2
ERYTHROCYTE [DISTWIDTH] IN BLOOD BY AUTOMATED COUNT: 14.1 % (ref 10–15)
GLUCOSE UR STRIP-MCNC: NEGATIVE MG/DL
HCT VFR BLD AUTO: 41.8 % (ref 35–47)
HGB BLD-MCNC: 13.8 G/DL (ref 11.7–15.7)
HGB UR QL STRIP: NEGATIVE
KETONES UR STRIP-MCNC: NEGATIVE MG/DL
LEUKOCYTE ESTERASE UR QL STRIP: NEGATIVE
MCH RBC QN AUTO: 27.1 PG (ref 26.5–33)
MCHC RBC AUTO-ENTMCNC: 33 G/DL (ref 31.5–36.5)
MCV RBC AUTO: 82 FL (ref 78–100)
NITRATE UR QL: NEGATIVE
PH UR STRIP: 8 [PH] (ref 5–7)
PLATELET # BLD AUTO: 286 10E3/UL (ref 150–450)
RBC # BLD AUTO: 5.1 10E6/UL (ref 3.8–5.2)
RBC #/AREA URNS AUTO: ABNORMAL /HPF
SKIP: ABNORMAL
SP GR UR STRIP: 1.02 (ref 1–1.03)
SQUAMOUS #/AREA URNS AUTO: ABNORMAL /LPF
UROBILINOGEN UR STRIP-MCNC: NORMAL MG/DL
WBC # BLD AUTO: 6.1 10E3/UL (ref 4–11)
WBC #/AREA URNS AUTO: ABNORMAL /HPF

## 2024-04-15 PROCEDURE — 81001 URINALYSIS AUTO W/SCOPE: CPT

## 2024-04-15 PROCEDURE — 84450 TRANSFERASE (AST) (SGOT): CPT

## 2024-04-15 PROCEDURE — 36415 COLL VENOUS BLD VENIPUNCTURE: CPT

## 2024-04-15 PROCEDURE — 84460 ALANINE AMINO (ALT) (SGPT): CPT

## 2024-04-15 PROCEDURE — 86140 C-REACTIVE PROTEIN: CPT

## 2024-04-15 PROCEDURE — 82565 ASSAY OF CREATININE: CPT

## 2024-04-15 PROCEDURE — 85027 COMPLETE CBC AUTOMATED: CPT

## 2024-04-15 PROCEDURE — 82040 ASSAY OF SERUM ALBUMIN: CPT

## 2024-10-21 ENCOUNTER — OFFICE VISIT (OUTPATIENT)
Dept: OPHTHALMOLOGY | Facility: CLINIC | Age: 34
End: 2024-10-21
Payer: COMMERCIAL

## 2024-10-21 DIAGNOSIS — D31.32 CHOROIDAL NEVUS, LEFT: ICD-10-CM

## 2024-10-21 DIAGNOSIS — Z79.60 LONG-TERM USE OF IMMUNOSUPPRESSANT MEDICATION: Primary | ICD-10-CM

## 2024-10-21 DIAGNOSIS — M08.00 JUVENILE RHEUMATOID ARTHRITIS (H): ICD-10-CM

## 2024-10-21 PROCEDURE — 92083 EXTENDED VISUAL FIELD XM: CPT | Performed by: OPHTHALMOLOGY

## 2024-10-21 PROCEDURE — 99207 FUNDUS PHOTOS OU (BOTH EYES): CPT | Performed by: OPHTHALMOLOGY

## 2024-10-21 PROCEDURE — 92015 DETERMINE REFRACTIVE STATE: CPT

## 2024-10-21 PROCEDURE — 92014 COMPRE OPH EXAM EST PT 1/>: CPT | Performed by: OPHTHALMOLOGY

## 2024-10-21 PROCEDURE — 92134 CPTRZ OPH DX IMG PST SGM RTA: CPT | Performed by: OPHTHALMOLOGY

## 2024-10-21 RX ORDER — TIRZEPATIDE 2.5 MG/.5ML
INJECTION, SOLUTION SUBCUTANEOUS
COMMUNITY

## 2024-10-21 ASSESSMENT — CONF VISUAL FIELD
OD_SUPERIOR_TEMPORAL_RESTRICTION: 0
OD_SUPERIOR_NASAL_RESTRICTION: 0
OS_SUPERIOR_TEMPORAL_RESTRICTION: 0
OS_INFERIOR_NASAL_RESTRICTION: 0
METHOD: COUNTING FINGERS
OS_NORMAL: 1
OS_INFERIOR_TEMPORAL_RESTRICTION: 0
OD_INFERIOR_TEMPORAL_RESTRICTION: 0
OD_NORMAL: 1
OD_INFERIOR_NASAL_RESTRICTION: 0
OS_SUPERIOR_NASAL_RESTRICTION: 0

## 2024-10-21 ASSESSMENT — TONOMETRY
IOP_METHOD: TONOPEN
OS_IOP_MMHG: 21
OD_IOP_MMHG: 21

## 2024-10-21 ASSESSMENT — VISUAL ACUITY
METHOD: SNELLEN - LINEAR
OD_SC+: -1
OS_SC: 20/15
OD_SC: 20/15

## 2024-10-21 ASSESSMENT — REFRACTION_MANIFEST
OS_CYLINDER: SPHERE
OD_CYLINDER: SPHERE
OD_SPHERE: PLANO
OS_SPHERE: -0.25

## 2024-10-21 NOTE — NURSING NOTE
Chief Complaints and History of Present Illnesses   Patient presents with    Annual Eye Exam       Chief Complaint(s) and History of Present Illness(es)       Annual Eye Exam              Laterality: both eyes              Comments    Annual eye health exam and plaquenil follow up. Patient has not noticed any visual changes since her last visit. No flashing lights or floaters. No ocular pain or discomfort. Sometimes she has dry eyes or allergy eyes.   Ocular Medications: Zaditor both eyes as needed. Artificial Tears both eyes as needed                    Berkley Mistry, COA

## 2024-10-22 ASSESSMENT — SLIT LAMP EXAM - LIDS
COMMENTS: NORMAL
COMMENTS: NORMAL

## 2024-10-22 ASSESSMENT — CUP TO DISC RATIO
OD_RATIO: 0.5
OS_RATIO: 0.4

## 2024-10-22 ASSESSMENT — EXTERNAL EXAM - RIGHT EYE: OD_EXAM: NORMAL

## 2024-10-22 ASSESSMENT — EXTERNAL EXAM - LEFT EYE: OS_EXAM: NORMAL

## 2024-10-22 NOTE — PROGRESS NOTES
HPI       Annual Eye Exam    In both eyes.             Comments    Annual eye health exam and plaquenil follow up. Patient has not noticed any visual changes since her last visit. No flashing lights or floaters. No ocular pain or discomfort. Sometimes she has dry eyes or allergy eyes.   Ocular Medications: Zaditor both eyes as needed. Artificial Tears both eyes as needed           Last edited by Berkley Mistry on 10/21/2024  3:00 PM.         Review of systems for the eyes was negative other than the pertinent positives/negatives listed in the HPI.      Assessment & Plan    HPI:  Alka Casey is a 34 year old female with history of juvenile idiopathic arthritis here for annual plaquenil screening. Vision is stable. She has no plans to discontinue plaquenil. Has had great response to treatment. Uses Zaditor and AT as needed     POHx: denies  PMHx: juvenile idiopathic arthritis , allergies  Current Medications:   Current Outpatient Medications   Medication Sig Dispense Refill    TIRZEPATIDE 2.5 MG/0.5ML vial Inject subcutaneously.      buPROPion (WELLBUTRIN XL) 300 MG 24 hr tablet Take 300 mg by mouth every morning      buPROPion HCl (WELLBUTRIN PO) Take 100 mg by mouth      cetirizine (ZYRTEC) 10 MG tablet cetirizine 10 mg tablet   TAKE 1 TABLET BY MOUTH EVERY DAY      hydroxychloroquine (PLAQUENIL) 200 MG tablet Take 2 tablets (400 mg) by mouth daily Annual Plaquenil toxicity eye screening required, please schedule this now. Thank you 180 tablet 3    levalbuterol (XOPENEX HFA) 45 MCG/ACT Inhaler Inhale 2 puffs into the lungs every 4 hours as needed for asthma symptoms. 1 Inhaler 3    montelukast (SINGULAIR) 10 MG tablet Take 1 tablet (10 mg) by mouth daily for asthma prevention. 90 tablet 3    sertraline (ZOLOFT) 100 MG tablet Take 1 tablet (100 mg) by mouth daily (Patient taking differently: Take 150 mg by mouth daily) 90 tablet 0     No current facility-administered medications for this visit.     FHx: refractive  error  PSHx: denies history of ocular surgeries       Current Eye Medications:      Assessment & Plan:  (Z79.60) Long-term use of immunosuppressant medication  (primary encounter diagnosis)  (M08.00) Juvenile rheumatoid arthritis (H)  Screening of ocular sequelae with Plaquenil use  Normal dilated fundus exam  Started plaquenil 2014, has been intermittently off during pregnancies  Current dose 400mg  Current weight 90kg    We discussed the low risk of toxicity in patient with <7 years of treatment (0.33%) which increases to 2.1% at 15 years.    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        Luc Sawyer MD     Attending Physician Attestation:  Complete documentation of historical and exam elements from today's encounter can be found in the full encounter summary report (not reduplicated in this progress note).  I personally obtained the chief complaint(s) and history of present illness.  I confirmed and edited as necessary the review of systems, past medical/surgical history, family history, social history, and examination findings as documented by others; and I examined the patient myself.  I personally reviewed the relevant tests, images, and reports as documented above.  I formulated and edited as necessary the assessment and plan and discussed the findings and management plan with the patient and family. - Luc Sawyer MD

## 2024-12-21 ENCOUNTER — HEALTH MAINTENANCE LETTER (OUTPATIENT)
Age: 34
End: 2024-12-21

## 2025-06-03 ENCOUNTER — ANCILLARY PROCEDURE (OUTPATIENT)
Dept: GENERAL RADIOLOGY | Facility: CLINIC | Age: 35
End: 2025-06-03
Attending: INTERNAL MEDICINE
Payer: COMMERCIAL

## 2025-06-03 ENCOUNTER — LAB (OUTPATIENT)
Dept: LAB | Facility: CLINIC | Age: 35
End: 2025-06-03
Payer: COMMERCIAL

## 2025-06-03 DIAGNOSIS — M08.00 JUVENILE RHEUMATOID ARTHRITIS (H): ICD-10-CM

## 2025-06-03 DIAGNOSIS — K20.0 EOSINOPHILIC ESOPHAGITIS: ICD-10-CM

## 2025-06-03 DIAGNOSIS — Z79.60 LONG-TERM USE OF IMMUNOSUPPRESSANT MEDICATION: ICD-10-CM

## 2025-06-03 LAB
ALBUMIN SERPL BCG-MCNC: 4.7 G/DL (ref 3.5–5.2)
ALBUMIN UR-MCNC: NEGATIVE MG/DL
ALT SERPL W P-5'-P-CCNC: 17 U/L (ref 0–50)
APPEARANCE UR: CLEAR
AST SERPL W P-5'-P-CCNC: 28 U/L (ref 0–45)
BASOPHILS # BLD AUTO: 0.1 10E3/UL (ref 0–0.2)
BASOPHILS NFR BLD AUTO: 1 %
BILIRUB UR QL STRIP: NEGATIVE
COLOR UR AUTO: NORMAL
CREAT SERPL-MCNC: 0.86 MG/DL (ref 0.51–0.95)
CRP SERPL-MCNC: <3 MG/L
EGFRCR SERPLBLD CKD-EPI 2021: >90 ML/MIN/1.73M2
EOSINOPHIL # BLD AUTO: 0.4 10E3/UL (ref 0–0.7)
EOSINOPHIL NFR BLD AUTO: 7 %
ERYTHROCYTE [DISTWIDTH] IN BLOOD BY AUTOMATED COUNT: 12.7 % (ref 10–15)
ERYTHROCYTE [SEDIMENTATION RATE] IN BLOOD BY WESTERGREN METHOD: 2 MM/HR (ref 0–20)
GLUCOSE UR STRIP-MCNC: NEGATIVE MG/DL
HCT VFR BLD AUTO: 42.5 % (ref 35–47)
HGB BLD-MCNC: 14.8 G/DL (ref 11.7–15.7)
HGB UR QL STRIP: NEGATIVE
IMM GRANULOCYTES # BLD: 0 10E3/UL
IMM GRANULOCYTES NFR BLD: 0 %
KETONES UR STRIP-MCNC: NEGATIVE MG/DL
LEUKOCYTE ESTERASE UR QL STRIP: NEGATIVE
LYMPHOCYTES # BLD AUTO: 1.7 10E3/UL (ref 0.8–5.3)
LYMPHOCYTES NFR BLD AUTO: 28 %
MCH RBC QN AUTO: 29.4 PG (ref 26.5–33)
MCHC RBC AUTO-ENTMCNC: 34.8 G/DL (ref 31.5–36.5)
MCV RBC AUTO: 85 FL (ref 78–100)
MONOCYTES # BLD AUTO: 0.4 10E3/UL (ref 0–1.3)
MONOCYTES NFR BLD AUTO: 6 %
NEUTROPHILS # BLD AUTO: 3.5 10E3/UL (ref 1.6–8.3)
NEUTROPHILS NFR BLD AUTO: 58 %
NITRATE UR QL: NEGATIVE
NRBC # BLD AUTO: 0 10E3/UL
NRBC BLD AUTO-RTO: 0 /100
PH UR STRIP: 7 [PH] (ref 5–7)
PLATELET # BLD AUTO: 280 10E3/UL (ref 150–450)
RBC # BLD AUTO: 5.03 10E6/UL (ref 3.8–5.2)
RBC #/AREA URNS AUTO: NORMAL /HPF
SKIP: NORMAL
SP GR UR STRIP: 1.01 (ref 1–1.03)
UROBILINOGEN UR STRIP-MCNC: NORMAL MG/DL
WBC # BLD AUTO: 6.1 10E3/UL (ref 4–11)
WBC #/AREA URNS AUTO: NORMAL /HPF

## 2025-06-03 PROCEDURE — 36415 COLL VENOUS BLD VENIPUNCTURE: CPT

## 2025-06-03 PROCEDURE — 85652 RBC SED RATE AUTOMATED: CPT

## 2025-06-03 PROCEDURE — 85025 COMPLETE CBC W/AUTO DIFF WBC: CPT

## 2025-06-03 PROCEDURE — 84460 ALANINE AMINO (ALT) (SGPT): CPT

## 2025-06-03 PROCEDURE — 84450 TRANSFERASE (AST) (SGOT): CPT

## 2025-06-03 PROCEDURE — 81001 URINALYSIS AUTO W/SCOPE: CPT

## 2025-06-03 PROCEDURE — 82040 ASSAY OF SERUM ALBUMIN: CPT

## 2025-06-03 PROCEDURE — 82565 ASSAY OF CREATININE: CPT

## 2025-06-03 PROCEDURE — 86140 C-REACTIVE PROTEIN: CPT

## 2025-06-04 ENCOUNTER — RESULTS FOLLOW-UP (OUTPATIENT)
Dept: RHEUMATOLOGY | Facility: CLINIC | Age: 35
End: 2025-06-04

## (undated) RX ORDER — FENTANYL CITRATE 50 UG/ML
INJECTION, SOLUTION INTRAMUSCULAR; INTRAVENOUS
Status: DISPENSED
Start: 2021-04-30

## (undated) RX ORDER — DIPHENHYDRAMINE HYDROCHLORIDE 50 MG/ML
INJECTION INTRAMUSCULAR; INTRAVENOUS
Status: DISPENSED
Start: 2021-04-30